# Patient Record
Sex: MALE | Race: WHITE | NOT HISPANIC OR LATINO
[De-identification: names, ages, dates, MRNs, and addresses within clinical notes are randomized per-mention and may not be internally consistent; named-entity substitution may affect disease eponyms.]

---

## 2019-04-18 PROBLEM — Z00.00 ENCOUNTER FOR PREVENTIVE HEALTH EXAMINATION: Status: ACTIVE | Noted: 2019-04-18

## 2019-04-22 ENCOUNTER — NON-APPOINTMENT (OUTPATIENT)
Age: 54
End: 2019-04-22

## 2019-04-22 ENCOUNTER — APPOINTMENT (OUTPATIENT)
Dept: CARDIOLOGY | Facility: CLINIC | Age: 54
End: 2019-04-22
Payer: COMMERCIAL

## 2019-04-22 VITALS
SYSTOLIC BLOOD PRESSURE: 140 MMHG | DIASTOLIC BLOOD PRESSURE: 90 MMHG | WEIGHT: 183 LBS | OXYGEN SATURATION: 98 % | TEMPERATURE: 98 F | BODY MASS INDEX: 26.2 KG/M2 | HEIGHT: 70 IN | HEART RATE: 80 BPM

## 2019-04-22 DIAGNOSIS — Z83.3 FAMILY HISTORY OF DIABETES MELLITUS: ICD-10-CM

## 2019-04-22 DIAGNOSIS — Z78.9 OTHER SPECIFIED HEALTH STATUS: ICD-10-CM

## 2019-04-22 DIAGNOSIS — Z86.39 PERSONAL HISTORY OF OTHER ENDOCRINE, NUTRITIONAL AND METABOLIC DISEASE: ICD-10-CM

## 2019-04-22 PROCEDURE — 93000 ELECTROCARDIOGRAM COMPLETE: CPT

## 2019-04-22 PROCEDURE — 99203 OFFICE O/P NEW LOW 30 MIN: CPT

## 2019-04-24 ENCOUNTER — TRANSCRIPTION ENCOUNTER (OUTPATIENT)
Age: 54
End: 2019-04-24

## 2019-04-24 ENCOUNTER — INPATIENT (INPATIENT)
Facility: HOSPITAL | Age: 54
LOS: 0 days | Discharge: HOME | DRG: 247 | End: 2019-04-25
Attending: HOSPITALIST | Admitting: INTERNAL MEDICINE
Payer: COMMERCIAL

## 2019-04-24 VITALS
SYSTOLIC BLOOD PRESSURE: 171 MMHG | HEART RATE: 64 BPM | OXYGEN SATURATION: 96 % | TEMPERATURE: 98 F | WEIGHT: 184.97 LBS | HEIGHT: 70 IN | DIASTOLIC BLOOD PRESSURE: 91 MMHG | RESPIRATION RATE: 18 BRPM

## 2019-04-24 DIAGNOSIS — R07.89 OTHER CHEST PAIN: ICD-10-CM

## 2019-04-24 LAB
ALBUMIN SERPL ELPH-MCNC: 4.6 G/DL — SIGNIFICANT CHANGE UP (ref 3.3–5)
ALP SERPL-CCNC: 75 U/L — SIGNIFICANT CHANGE UP (ref 40–120)
ALT FLD-CCNC: 46 U/L — HIGH (ref 10–45)
ANION GAP SERPL CALC-SCNC: 12 MMOL/L — SIGNIFICANT CHANGE UP (ref 5–17)
AST SERPL-CCNC: 24 U/L — SIGNIFICANT CHANGE UP (ref 10–40)
BILIRUB SERPL-MCNC: 0.2 MG/DL — SIGNIFICANT CHANGE UP (ref 0.2–1.2)
BUN SERPL-MCNC: 18 MG/DL — SIGNIFICANT CHANGE UP (ref 7–23)
CALCIUM SERPL-MCNC: 9.6 MG/DL — SIGNIFICANT CHANGE UP (ref 8.4–10.5)
CHLORIDE SERPL-SCNC: 103 MMOL/L — SIGNIFICANT CHANGE UP (ref 96–108)
CO2 SERPL-SCNC: 23 MMOL/L — SIGNIFICANT CHANGE UP (ref 22–31)
CREAT SERPL-MCNC: 0.74 MG/DL — SIGNIFICANT CHANGE UP (ref 0.5–1.3)
GLUCOSE BLDC GLUCOMTR-MCNC: 122 MG/DL — HIGH (ref 70–99)
GLUCOSE BLDC GLUCOMTR-MCNC: 132 MG/DL — HIGH (ref 70–99)
GLUCOSE BLDC GLUCOMTR-MCNC: 259 MG/DL — HIGH (ref 70–99)
GLUCOSE SERPL-MCNC: 136 MG/DL — HIGH (ref 70–99)
HCT VFR BLD CALC: 38.6 % — LOW (ref 39–50)
HGB BLD-MCNC: 13.1 G/DL — SIGNIFICANT CHANGE UP (ref 13–17)
MCHC RBC-ENTMCNC: 29.9 PG — SIGNIFICANT CHANGE UP (ref 27–34)
MCHC RBC-ENTMCNC: 33.9 GM/DL — SIGNIFICANT CHANGE UP (ref 32–36)
MCV RBC AUTO: 88.3 FL — SIGNIFICANT CHANGE UP (ref 80–100)
PLATELET # BLD AUTO: 241 K/UL — SIGNIFICANT CHANGE UP (ref 150–400)
POTASSIUM SERPL-MCNC: 4.1 MMOL/L — SIGNIFICANT CHANGE UP (ref 3.5–5.3)
POTASSIUM SERPL-SCNC: 4.1 MMOL/L — SIGNIFICANT CHANGE UP (ref 3.5–5.3)
PROT SERPL-MCNC: 7.1 G/DL — SIGNIFICANT CHANGE UP (ref 6–8.3)
RBC # BLD: 4.38 M/UL — SIGNIFICANT CHANGE UP (ref 4.2–5.8)
RBC # FLD: 12.3 % — SIGNIFICANT CHANGE UP (ref 10.3–14.5)
SODIUM SERPL-SCNC: 138 MMOL/L — SIGNIFICANT CHANGE UP (ref 135–145)
WBC # BLD: 9.2 K/UL — SIGNIFICANT CHANGE UP (ref 3.8–10.5)
WBC # FLD AUTO: 9.2 K/UL — SIGNIFICANT CHANGE UP (ref 3.8–10.5)

## 2019-04-24 PROCEDURE — 93458 L HRT ARTERY/VENTRICLE ANGIO: CPT | Mod: 26,59,GC

## 2019-04-24 PROCEDURE — 92928 PRQ TCAT PLMT NTRAC ST 1 LES: CPT | Mod: RC,GC

## 2019-04-24 PROCEDURE — 99152 MOD SED SAME PHYS/QHP 5/>YRS: CPT | Mod: GC

## 2019-04-24 PROCEDURE — 93010 ELECTROCARDIOGRAM REPORT: CPT | Mod: 76

## 2019-04-24 RX ORDER — SODIUM CHLORIDE 9 MG/ML
1000 INJECTION, SOLUTION INTRAVENOUS
Qty: 0 | Refills: 0 | Status: DISCONTINUED | OUTPATIENT
Start: 2019-04-24 | End: 2019-04-25

## 2019-04-24 RX ORDER — ACETAMINOPHEN 500 MG
650 TABLET ORAL EVERY 6 HOURS
Qty: 0 | Refills: 0 | Status: DISCONTINUED | OUTPATIENT
Start: 2019-04-24 | End: 2019-04-25

## 2019-04-24 RX ORDER — OXYCODONE AND ACETAMINOPHEN 5; 325 MG/1; MG/1
1 TABLET ORAL EVERY 4 HOURS
Qty: 0 | Refills: 0 | Status: DISCONTINUED | OUTPATIENT
Start: 2019-04-24 | End: 2019-04-25

## 2019-04-24 RX ORDER — SODIUM CHLORIDE 9 MG/ML
3 INJECTION INTRAMUSCULAR; INTRAVENOUS; SUBCUTANEOUS EVERY 8 HOURS
Qty: 0 | Refills: 0 | Status: DISCONTINUED | OUTPATIENT
Start: 2019-04-24 | End: 2019-04-25

## 2019-04-24 RX ORDER — DEXTROSE 50 % IN WATER 50 %
15 SYRINGE (ML) INTRAVENOUS ONCE
Qty: 0 | Refills: 0 | Status: DISCONTINUED | OUTPATIENT
Start: 2019-04-24 | End: 2019-04-25

## 2019-04-24 RX ORDER — GLUCAGON INJECTION, SOLUTION 0.5 MG/.1ML
1 INJECTION, SOLUTION SUBCUTANEOUS ONCE
Qty: 0 | Refills: 0 | Status: DISCONTINUED | OUTPATIENT
Start: 2019-04-24 | End: 2019-04-25

## 2019-04-24 RX ORDER — ATORVASTATIN CALCIUM 80 MG/1
80 TABLET, FILM COATED ORAL AT BEDTIME
Qty: 0 | Refills: 0 | Status: DISCONTINUED | OUTPATIENT
Start: 2019-04-24 | End: 2019-04-25

## 2019-04-24 RX ORDER — DEXTROSE 50 % IN WATER 50 %
25 SYRINGE (ML) INTRAVENOUS ONCE
Qty: 0 | Refills: 0 | Status: DISCONTINUED | OUTPATIENT
Start: 2019-04-24 | End: 2019-04-25

## 2019-04-24 RX ORDER — DEXTROSE 50 % IN WATER 50 %
12.5 SYRINGE (ML) INTRAVENOUS ONCE
Qty: 0 | Refills: 0 | Status: DISCONTINUED | OUTPATIENT
Start: 2019-04-24 | End: 2019-04-25

## 2019-04-24 RX ORDER — LEVOTHYROXINE SODIUM 125 MCG
75 TABLET ORAL DAILY
Qty: 0 | Refills: 0 | Status: DISCONTINUED | OUTPATIENT
Start: 2019-04-24 | End: 2019-04-25

## 2019-04-24 RX ORDER — INSULIN LISPRO 100/ML
VIAL (ML) SUBCUTANEOUS AT BEDTIME
Qty: 0 | Refills: 0 | Status: DISCONTINUED | OUTPATIENT
Start: 2019-04-24 | End: 2019-04-25

## 2019-04-24 RX ORDER — METOPROLOL TARTRATE 50 MG
50 TABLET ORAL DAILY
Qty: 0 | Refills: 0 | Status: DISCONTINUED | OUTPATIENT
Start: 2019-04-24 | End: 2019-04-25

## 2019-04-24 RX ORDER — TICAGRELOR 90 MG/1
90 TABLET ORAL
Qty: 0 | Refills: 0 | Status: DISCONTINUED | OUTPATIENT
Start: 2019-04-25 | End: 2019-04-25

## 2019-04-24 RX ORDER — INSULIN LISPRO 100/ML
VIAL (ML) SUBCUTANEOUS
Qty: 0 | Refills: 0 | Status: DISCONTINUED | OUTPATIENT
Start: 2019-04-24 | End: 2019-04-25

## 2019-04-24 RX ORDER — INSULIN GLARGINE 100 [IU]/ML
28 INJECTION, SOLUTION SUBCUTANEOUS AT BEDTIME
Qty: 0 | Refills: 0 | Status: DISCONTINUED | OUTPATIENT
Start: 2019-04-24 | End: 2019-04-25

## 2019-04-24 RX ORDER — ASPIRIN/CALCIUM CARB/MAGNESIUM 324 MG
81 TABLET ORAL DAILY
Qty: 0 | Refills: 0 | Status: DISCONTINUED | OUTPATIENT
Start: 2019-04-25 | End: 2019-04-25

## 2019-04-24 RX ADMIN — ATORVASTATIN CALCIUM 80 MILLIGRAM(S): 80 TABLET, FILM COATED ORAL at 22:00

## 2019-04-24 RX ADMIN — Medication 50 MILLIGRAM(S): at 22:00

## 2019-04-24 RX ADMIN — Medication 1: at 22:00

## 2019-04-24 RX ADMIN — Medication 5 MILLIGRAM(S): at 22:00

## 2019-04-24 RX ADMIN — SODIUM CHLORIDE 3 MILLILITER(S): 9 INJECTION INTRAMUSCULAR; INTRAVENOUS; SUBCUTANEOUS at 21:58

## 2019-04-24 RX ADMIN — OXYCODONE AND ACETAMINOPHEN 1 TABLET(S): 5; 325 TABLET ORAL at 22:08

## 2019-04-24 RX ADMIN — OXYCODONE AND ACETAMINOPHEN 1 TABLET(S): 5; 325 TABLET ORAL at 22:43

## 2019-04-24 RX ADMIN — INSULIN GLARGINE 28 UNIT(S): 100 INJECTION, SOLUTION SUBCUTANEOUS at 22:01

## 2019-04-24 NOTE — PROGRESS NOTE ADULT - SUBJECTIVE AND OBJECTIVE BOX
Removal of Right Radial Band    Pulses in the right upper extremity are palpable. The patient was placed in the supine position. The insertion site was identified and the band deflated per protocol. The radial band was removed slowly. Direct pressure was applied for 15 minutes.      Monitoring of the right wrist and both upper extremities including neuro-vascular checks and vital signs every 15 minutes x 4.    Complications: None    Comments:

## 2019-04-24 NOTE — H&P CARDIOLOGY - HISTORY OF PRESENT ILLNESS
54 y/o  male with PMHx of DMT2 x 9 years, Dyslidemia presents with chest discomfort when bending or stretching. Pt with no symptoms with exercise, pt with ocassional dyspnea. Pt underwent Nuclear Stress Test which showed reveal inferoseptal defect, LVEF 43%, normal EST. Pt was referred for Cardiac Cath by DR Melgar 54 y/o  male with PMHx of DMT2 x 9 years ([id number]-6.6 3/2019, managed by Dr Barbosa PCP  ), Dyslidemia presents with chest discomfort when bending or stretching. Pt with no symptoms with exercise, pt with ocassional dyspnea. Pt underwent Nuclear Stress Test which showed reveal inferoseptal defect, LVEF 43%, normal EST. Pt was referred for Cardiac Cath by DR Melgar 54 y/o  male with PMHx of DMT2 x 9 years (Iafy2k-8.6 3/2019, managed by Dr Barbosa PCP  ), Dyslidemia, ARELIS-does not use CPAP presents with chest discomfort when bending or stretching. Pt with no symptoms with exercise, pt with ocassional dyspnea. Pt underwent Nuclear Stress Test which showed reveal inferoseptal defect, LVEF 43%, normal EST. Pt was referred for Cardiac Cath by DR Melgar.

## 2019-04-25 ENCOUNTER — TRANSCRIPTION ENCOUNTER (OUTPATIENT)
Age: 54
End: 2019-04-25

## 2019-04-25 VITALS
DIASTOLIC BLOOD PRESSURE: 89 MMHG | SYSTOLIC BLOOD PRESSURE: 161 MMHG | RESPIRATION RATE: 17 BRPM | HEART RATE: 66 BPM | TEMPERATURE: 98 F | OXYGEN SATURATION: 99 %

## 2019-04-25 LAB
ANION GAP SERPL CALC-SCNC: 13 MMOL/L — SIGNIFICANT CHANGE UP (ref 5–17)
BASOPHILS # BLD AUTO: 0.1 K/UL — SIGNIFICANT CHANGE UP (ref 0–0.2)
BASOPHILS NFR BLD AUTO: 0.8 % — SIGNIFICANT CHANGE UP (ref 0–2)
BUN SERPL-MCNC: 17 MG/DL — SIGNIFICANT CHANGE UP (ref 7–23)
CALCIUM SERPL-MCNC: 8.9 MG/DL — SIGNIFICANT CHANGE UP (ref 8.4–10.5)
CHLORIDE SERPL-SCNC: 104 MMOL/L — SIGNIFICANT CHANGE UP (ref 96–108)
CO2 SERPL-SCNC: 23 MMOL/L — SIGNIFICANT CHANGE UP (ref 22–31)
CREAT SERPL-MCNC: 0.84 MG/DL — SIGNIFICANT CHANGE UP (ref 0.5–1.3)
EOSINOPHIL # BLD AUTO: 0.3 K/UL — SIGNIFICANT CHANGE UP (ref 0–0.5)
EOSINOPHIL NFR BLD AUTO: 4.3 % — SIGNIFICANT CHANGE UP (ref 0–6)
GLUCOSE BLDC GLUCOMTR-MCNC: 157 MG/DL — HIGH (ref 70–99)
GLUCOSE SERPL-MCNC: 218 MG/DL — HIGH (ref 70–99)
HBA1C BLD-MCNC: 8.9 % — HIGH (ref 4–5.6)
HCT VFR BLD CALC: 34.2 % — LOW (ref 39–50)
HGB BLD-MCNC: 12.1 G/DL — LOW (ref 13–17)
LYMPHOCYTES # BLD AUTO: 1.6 K/UL — SIGNIFICANT CHANGE UP (ref 1–3.3)
LYMPHOCYTES # BLD AUTO: 19.6 % — SIGNIFICANT CHANGE UP (ref 13–44)
MCHC RBC-ENTMCNC: 31.3 PG — SIGNIFICANT CHANGE UP (ref 27–34)
MCHC RBC-ENTMCNC: 35.5 GM/DL — SIGNIFICANT CHANGE UP (ref 32–36)
MCV RBC AUTO: 88.1 FL — SIGNIFICANT CHANGE UP (ref 80–100)
MONOCYTES # BLD AUTO: 0.6 K/UL — SIGNIFICANT CHANGE UP (ref 0–0.9)
MONOCYTES NFR BLD AUTO: 7.8 % — SIGNIFICANT CHANGE UP (ref 2–14)
NEUTROPHILS # BLD AUTO: 5.5 K/UL — SIGNIFICANT CHANGE UP (ref 1.8–7.4)
NEUTROPHILS NFR BLD AUTO: 67.4 % — SIGNIFICANT CHANGE UP (ref 43–77)
PLATELET # BLD AUTO: 201 K/UL — SIGNIFICANT CHANGE UP (ref 150–400)
POTASSIUM SERPL-MCNC: 3.7 MMOL/L — SIGNIFICANT CHANGE UP (ref 3.5–5.3)
POTASSIUM SERPL-SCNC: 3.7 MMOL/L — SIGNIFICANT CHANGE UP (ref 3.5–5.3)
RBC # BLD: 3.88 M/UL — LOW (ref 4.2–5.8)
RBC # FLD: 12.3 % — SIGNIFICANT CHANGE UP (ref 10.3–14.5)
SODIUM SERPL-SCNC: 140 MMOL/L — SIGNIFICANT CHANGE UP (ref 135–145)
WBC # BLD: 8.1 K/UL — SIGNIFICANT CHANGE UP (ref 3.8–10.5)
WBC # FLD AUTO: 8.1 K/UL — SIGNIFICANT CHANGE UP (ref 3.8–10.5)

## 2019-04-25 PROCEDURE — C9600: CPT | Mod: RC

## 2019-04-25 PROCEDURE — 99153 MOD SED SAME PHYS/QHP EA: CPT

## 2019-04-25 PROCEDURE — 85027 COMPLETE CBC AUTOMATED: CPT

## 2019-04-25 PROCEDURE — C1874: CPT

## 2019-04-25 PROCEDURE — 80048 BASIC METABOLIC PNL TOTAL CA: CPT

## 2019-04-25 PROCEDURE — C1725: CPT

## 2019-04-25 PROCEDURE — 93010 ELECTROCARDIOGRAM REPORT: CPT

## 2019-04-25 PROCEDURE — 99152 MOD SED SAME PHYS/QHP 5/>YRS: CPT

## 2019-04-25 PROCEDURE — C1769: CPT

## 2019-04-25 PROCEDURE — 93458 L HRT ARTERY/VENTRICLE ANGIO: CPT | Mod: 59

## 2019-04-25 PROCEDURE — 93005 ELECTROCARDIOGRAM TRACING: CPT

## 2019-04-25 PROCEDURE — C1894: CPT

## 2019-04-25 PROCEDURE — 83036 HEMOGLOBIN GLYCOSYLATED A1C: CPT

## 2019-04-25 PROCEDURE — C1887: CPT

## 2019-04-25 PROCEDURE — 82962 GLUCOSE BLOOD TEST: CPT

## 2019-04-25 PROCEDURE — 80053 COMPREHEN METABOLIC PANEL: CPT

## 2019-04-25 RX ORDER — TICAGRELOR 90 MG/1
1 TABLET ORAL
Qty: 60 | Refills: 0 | OUTPATIENT
Start: 2019-04-25 | End: 2019-05-24

## 2019-04-25 RX ORDER — ACETAMINOPHEN 500 MG
2 TABLET ORAL
Qty: 0 | Refills: 0 | DISCHARGE
Start: 2019-04-25

## 2019-04-25 RX ORDER — TICAGRELOR 90 MG/1
1 TABLET ORAL
Qty: 180 | Refills: 3
Start: 2019-04-25

## 2019-04-25 RX ADMIN — Medication 5 MILLIGRAM(S): at 05:21

## 2019-04-25 RX ADMIN — SODIUM CHLORIDE 3 MILLILITER(S): 9 INJECTION INTRAMUSCULAR; INTRAVENOUS; SUBCUTANEOUS at 05:21

## 2019-04-25 RX ADMIN — TICAGRELOR 90 MILLIGRAM(S): 90 TABLET ORAL at 05:21

## 2019-04-25 RX ADMIN — Medication 30 MILLILITER(S): at 08:20

## 2019-04-25 RX ADMIN — Medication 1: at 08:11

## 2019-04-25 RX ADMIN — Medication 81 MILLIGRAM(S): at 05:21

## 2019-04-25 RX ADMIN — Medication 75 MICROGRAM(S): at 05:21

## 2019-04-25 NOTE — DISCHARGE NOTE NURSING/CASE MANAGEMENT/SOCIAL WORK - NSDCDPATPORTLINK_GEN_ALL_CORE
You can access the [facility], offered by [facility], by registering with the following website: [email or web address]

## 2019-04-25 NOTE — DISCHARGE NOTE PROVIDER - NSDCCPCAREPLAN_GEN_ALL_CORE_FT
PRINCIPAL DISCHARGE DIAGNOSIS  Diagnosis: CAD (coronary artery disease), native coronary artery  Assessment and Plan of Treatment: Do not stop you Aspirin or Brilinta unless instructed to do so by your cardiologist, they help keep your stented arteries open.   No heavy lifting or pushing/pulling with procedure arm for 2 weeks. No driving for 2 days. You may shower 24 hours following the procedure but avoid baths/swimming for 1 week. Check your wrist site for bleeding and/or swelling daily following procedure and call your doctor immediately if it occurs or if you experience increased pain at the site. Follow up with your cardiologist in 1-2 weeks. You may call [facility] if you have any questions/concerns regarding your procedure [phone].      SECONDARY DISCHARGE DIAGNOSES  Diagnosis: HTN (hypertension)  Assessment and Plan of Treatment: Continue with your blood pressure medications; eat a heart healthy diet with low salt diet; exercise regularly (consult with your physician or cardiologist first); maintain a heart healthy weight; if you smoke - quit (A resource to help you stop smoking is the [facility] [employer] [facility] – phone number [phone].); include healthy ways to manage stress. Continue to follow with your primary care physician or cardiologist.    Diagnosis: HLD (hyperlipidemia)  Assessment and Plan of Treatment: Continue with your cholesterol medications. Eat a heart healthy diet that is low in saturated fats and salt, and includes whole grains, fruits, vegetables and lean protein; exercise regularly (consult with your physician or cardiologist first); maintain a heart healthy weight; if you smoke - quit (A resource to help you stop smoking is the [facility] – phone number [phone].). Continue to follow with your primary physician or cardiologist.    Diagnosis: Type 2 diabetes, HbA1c goal < 7%  Assessment and Plan of Treatment: Your Hemoglobin A1c level is   Continue to follow with your primary care MD or your endocrinologist.  Follow a heart healthy diabetic diet. If you check your fingerstick glucose at home, call your MD if it is greater than 250mg/dL on 2 occasions or less than 100mg/dL on 2 occasions. Know signs of low blood sugar, such as: dizziness, shakiness, sweating, confusion, hunger, nervousness-drink 4 ounces apple juice if occurs and call your doctor. Know early signs of high blood sugar, such as: frequent urination, increased thirst, blurry vision, fatigue, headache - call your doctor if this occurs. Follow with other practitioners to care for your diabetes, such as ophthalmologist and podiatrist. PRINCIPAL DISCHARGE DIAGNOSIS  Diagnosis: CAD (coronary artery disease), native coronary artery  Assessment and Plan of Treatment: Do not stop you Aspirin or Brilinta unless instructed to do so by your cardiologist, they help keep your stented arteries open.   No heavy lifting or pushing/pulling with procedure arm for 2 weeks. No driving for 2 days. You may shower 24 hours following the procedure but avoid baths/swimming for 1 week. Check your wrist site for bleeding and/or swelling daily following procedure and call your doctor immediately if it occurs or if you experience increased pain at the site. Follow up with your cardiologist in 1-2 weeks. You may call [facility] if you have any questions/concerns regarding your procedure [phone].      SECONDARY DISCHARGE DIAGNOSES  Diagnosis: HTN (hypertension)  Assessment and Plan of Treatment: Continue with your blood pressure medications; eat a heart healthy diet with low salt diet; exercise regularly (consult with your physician or cardiologist first); maintain a heart healthy weight; if you smoke - quit (A resource to help you stop smoking is the [facility] [employer] Control – phone number [phone].); include healthy ways to manage stress. Continue to follow with your primary care physician or cardiologist.    Diagnosis: HLD (hyperlipidemia)  Assessment and Plan of Treatment: Continue with your cholesterol medications. Eat a heart healthy diet that is low in saturated fats and salt, and includes whole grains, fruits, vegetables and lean protein; exercise regularly (consult with your physician or cardiologist first); maintain a heart healthy weight; if you smoke - quit (A resource to help you stop smoking is the [facility] for Tobacco [facility] – phone number [phone].). Continue to follow with your primary physician or cardiologist.    Diagnosis: Type 2 diabetes, HbA1c goal < 7%  Assessment and Plan of Treatment: Your Hemoglobin A1c level is 8.9 .  Continue to follow with your primary care MD or your endocrinologist.  Follow a heart healthy diabetic diet. If you check your fingerstick glucose at home, call your MD if it is greater than 250mg/dL on 2 occasions or less than 100mg/dL on 2 occasions. Know signs of low blood sugar, such as: dizziness, shakiness, sweating, confusion, hunger, nervousness-drink 4 ounces apple juice if occurs and call your doctor. Know early signs of high blood sugar, such as: frequent urination, increased thirst, blurry vision, fatigue, headache - call your doctor if this occurs. Follow with other practitioners to care for your diabetes, such as ophthalmologist and podiatrist.

## 2019-04-25 NOTE — DISCHARGE NOTE PROVIDER - CARE PROVIDER_API CALL
Milton Melgar)  Cardiology; Internal Medicine  [address], [address]  [place] [address]  Phone: [phone]  Fax: [phone]  Follow Up Time: Milton Melgar)  Cardiology; Internal Medicine  [address] [facility] [address]  [place] [address]  Phone: [phone]  Fax: [phone]  Follow Up Time: 2 weeks

## 2019-04-25 NOTE — DISCHARGE NOTE PROVIDER - HOSPITAL COURSE
HPI:    54 y/o  male with PMHx of DMT2 x 9 years ([id number]-6.6 3/2019, managed by Dr Barbosa PCP  ), Dyslidemia, ARELIS-does not use CPAP presents with chest discomfort when bending or stretching. Pt with no symptoms with exercise, pt with ocassional dyspnea. Pt underwent Nuclear Stress Test which showed reveal inferoseptal defect, LVEF 43%, normal EST. Pt was referred for Cardiac Cath by DR Melgar. (24 Apr 2019 16:34)        4/24 cardiac cath with 2 mid RCA stents. Right radial site without swelling, bleeding. 52 y/o  male with PMHx of DMT2 x 9 years (Qggd1p-7.6 3/2019, managed by Dr Barbosa PCP  ), Dyslidemia, ARELIS-does not use CPAP presents with chest discomfort when bending or stretching. Pt with no symptoms with exercise, pt with ocassional dyspnea. Pt underwent Nuclear Stress Test which showed reveal inferoseptal defect, LVEF 43%, normal EST. Pt was referred for Cardiac Cath by DR Melgar.  Patient is now s/p ELSY x 2 to mRCA via RRA access on 4/24/2019.  He tolerated the procedure well.  Post PCI EKG without acute changes.  RRA site benign without presence of bleeding/hematoma, radial pulse palpable, patient is without complaints.  Patient remains hemodynamically stable and his hospital course was otherwise uneventful.  Patient is now medically stable for discharge home today. 52 y/o  male with PMHx of DMT2 x 9 years ([id number]-8.6 3/2019, managed by Dr Barbosa PCP  ), Dyslidemia, ARELIS-does not use CPAP presents with chest discomfort when bending or stretching. Pt with no symptoms with exercise, pt with ocassional dyspnea. Pt underwent Nuclear Stress Test which showed reveal inferoseptal defect, LVEF 43%, normal EST. Pt was referred for Cardiac Cath by DR Melgar.  Patient is now s/p ELSY x 2 to mRCA via RRA access on 4/24/2019.  He tolerated the procedure well.  Post PCI EKG without acute changes.  RRA site benign without presence of bleeding/hematoma, radial pulse palpable, patient is without complaints.  Hemoglobin A1C 8.9, patient to follow-up with his PCP as outpatient.  Patient to f/u with Dr. Melgar as outpatient for BP control.  Patient remains hemodynamically stable and his hospital course was otherwise uneventful.  Patient is now medically stable for discharge home today.

## 2019-04-25 NOTE — DISCHARGE NOTE PROVIDER - NSDCPNSUBOBJ_GEN_ALL_CORE
Patient is a 53y old  Male who presents with a chief complaint of abnormal stress test (2019 01:35)                    Allergies        No Known Allergies        Intolerances                Medications:    acetaminophen   Tablet .. 650 milliGRAM(s) Oral every 6 hours PRN    aspirin enteric coated 81 milliGRAM(s) Oral daily    atorvastatin 80 milliGRAM(s) Oral at bedtime    dextrose 40% Gel 15 Gram(s) Oral once PRN    dextrose 5%. 1000 milliLiter(s) IV Continuous <Continuous>    dextrose 50% Injectable 12.5 Gram(s) IV Push once    dextrose 50% Injectable 25 Gram(s) IV Push once    dextrose 50% Injectable 25 Gram(s) IV Push once    enalapril 5 milliGRAM(s) Oral daily    glucagon  Injectable 1 milliGRAM(s) IntraMuscular once PRN    insulin glargine Injectable (LANTUS) 28 Unit(s) SubCutaneous at bedtime    insulin lispro (HumaLOG) corrective regimen sliding scale   SubCutaneous three times a day before meals    insulin lispro (HumaLOG) corrective regimen sliding scale   SubCutaneous at bedtime    levothyroxine 75 MICROGram(s) Oral daily    metoprolol succinate ER 50 milliGRAM(s) Oral daily    oxyCODONE    5 mG/acetaminophen 325 mG 1 Tablet(s) Oral every 4 hours PRN    sodium chloride 0.9% lock flush 3 milliLiter(s) IV Push every 8 hours    ticagrelor 90 milliGRAM(s) Oral two times a day            Vitals:    T(C): 36.3 (19 @ 19:30), Max: 36.6 (19 @ 16:34)    HR: 61 (19 @ 01:30) (61 - 72)    BP: 180/98 (19 @ 01:30) (153/96 - 180/98)    BP(mean): 117 (19 @ 16:34) (117 - 117)    RR: 16 (19 @ 01:30) (16 - 18)    SpO2: 98% (19 @ 01:30) (95% - 100%)    Wt(kg): --    Daily Height in cm: 177.8 (2019 19:30)      Daily Weight in k.9 (2019 16:34)    I&O's Summary        2019 07:01  -  2019 01:48    --------------------------------------------------------    IN: 240 mL / OUT: 900 mL / NET: -660 mL                    Physical Exam:    Appearance: Normal    Eyes: PERRL, EOMI    HENT: Normal oral muscosa, NC/AT    Cardiovascular: S1S2, RRR, No M/R/G, no JVD, No Lower extremity edema    Procedural Access Site: No hematoma, Non-tender to palpation, 2+ pulse, No bruit, No Ecchymosis    Respiratory: Clear to auscultation bilaterally    Gastrointestinal: Soft, Non tender, Normal Bowel Sounds    Musculoskeletal: No clubbing, No joint deformity     Neurologic: Non-focal    Lymphatic: No lymphadenopathy    Psychiatry: AAOx3, Mood & affect appropriate    Skin: No rashes, No ecchymoses, No cyanosis                140  |  104  |  17    ----------------------------<  218<H>    3.7   |  23  |  0.84        Ca    8.9      2019 00:57        TPro  7.1  /  Alb  4.6  /  TBili  0.2  /  DBili  x   /  AST  24  /  ALT  46<H>  /  AlkPhos  75  04-24                        Lipid panel     Hgb A1c                             12.1     8.1   )-----------( 201      ( 2019 00:57 )               34.2                 ECG: SR 63 bpm        Cath: 2 mid RCA stents        Imaging:        Interpretation of Telemetry:

## 2019-04-29 NOTE — PHYSICAL EXAM
[General Appearance - Well Developed] : well developed [Normal Appearance] : normal appearance [No Deformities] : no deformities [Well Groomed] : well groomed [General Appearance - Well Nourished] : well nourished [General Appearance - In No Acute Distress] : no acute distress [Normal Conjunctiva] : the conjunctiva exhibited no abnormalities [Normal Oral Mucosa] : normal oral mucosa [Eyelids - No Xanthelasma] : the eyelids demonstrated no xanthelasmas [No Oral Pallor] : no oral pallor [No Oral Cyanosis] : no oral cyanosis [Normal Jugular Venous A Waves Present] : normal jugular venous A waves present [Normal Jugular Venous V Waves Present] : normal jugular venous V waves present [Heart Rate And Rhythm] : heart rate and rhythm were normal [No Jugular Venous Clemente A Waves] : no jugular venous clemente A waves [Heart Sounds] : normal S1 and S2 [Murmurs] : no murmurs present [Exaggerated Use Of Accessory Muscles For Inspiration] : no accessory muscle use [Auscultation Breath Sounds / Voice Sounds] : lungs were clear to auscultation bilaterally [Respiration, Rhythm And Depth] : normal respiratory rhythm and effort [Abdomen Soft] : soft [Abdomen Tenderness] : non-tender [Abdomen Mass (___ Cm)] : no abdominal mass palpated [Gait - Sufficient For Exercise Testing] : the gait was sufficient for exercise testing [Abnormal Walk] : normal gait [Cyanosis, Localized] : no localized cyanosis [Nail Clubbing] : no clubbing of the fingernails [Petechial Hemorrhages (___cm)] : no petechial hemorrhages [] : no rash [No Venous Stasis] : no venous stasis [Skin Color & Pigmentation] : normal skin color and pigmentation [Skin Lesions] : no skin lesions [No Skin Ulcers] : no skin ulcer [No Xanthoma] : no  xanthoma was observed [Oriented To Time, Place, And Person] : oriented to person, place, and time [Affect] : the affect was normal [Mood] : the mood was normal [No Anxiety] : not feeling anxious [FreeTextEntry1] : reproducible pain left chest

## 2019-04-29 NOTE — PHYSICAL EXAM
[General Appearance - Well Developed] : well developed [Normal Appearance] : normal appearance [General Appearance - Well Nourished] : well nourished [No Deformities] : no deformities [Well Groomed] : well groomed [Normal Conjunctiva] : the conjunctiva exhibited no abnormalities [General Appearance - In No Acute Distress] : no acute distress [Normal Oral Mucosa] : normal oral mucosa [Eyelids - No Xanthelasma] : the eyelids demonstrated no xanthelasmas [No Oral Pallor] : no oral pallor [No Oral Cyanosis] : no oral cyanosis [Normal Jugular Venous V Waves Present] : normal jugular venous V waves present [Normal Jugular Venous A Waves Present] : normal jugular venous A waves present [No Jugular Venous Clemente A Waves] : no jugular venous clemente A waves [Heart Rate And Rhythm] : heart rate and rhythm were normal [Murmurs] : no murmurs present [Heart Sounds] : normal S1 and S2 [Auscultation Breath Sounds / Voice Sounds] : lungs were clear to auscultation bilaterally [Exaggerated Use Of Accessory Muscles For Inspiration] : no accessory muscle use [Respiration, Rhythm And Depth] : normal respiratory rhythm and effort [Abdomen Soft] : soft [Abdomen Tenderness] : non-tender [Abdomen Mass (___ Cm)] : no abdominal mass palpated [Gait - Sufficient For Exercise Testing] : the gait was sufficient for exercise testing [Abnormal Walk] : normal gait [Cyanosis, Localized] : no localized cyanosis [Nail Clubbing] : no clubbing of the fingernails [Petechial Hemorrhages (___cm)] : no petechial hemorrhages [No Venous Stasis] : no venous stasis [] : no rash [Skin Color & Pigmentation] : normal skin color and pigmentation [Skin Lesions] : no skin lesions [No Skin Ulcers] : no skin ulcer [No Xanthoma] : no  xanthoma was observed [Oriented To Time, Place, And Person] : oriented to person, place, and time [Affect] : the affect was normal [Mood] : the mood was normal [No Anxiety] : not feeling anxious [FreeTextEntry1] : reproducible pain left chest

## 2019-04-29 NOTE — HISTORY OF PRESENT ILLNESS
[FreeTextEntry1] : 52 y/o pt with dm x 9 years /pt with dyslidemia .pt s/p recent nuclear stress test with infero septal ischemia pt with 9 minutes of exercise .pt with nl est .pt with chest discomfort when bending or streerting .pt with no symptoms with exercise pt with ocassional dyspnea .

## 2019-05-09 ENCOUNTER — INPATIENT (INPATIENT)
Facility: HOSPITAL | Age: 54
LOS: 3 days | Discharge: HOME | DRG: 247 | End: 2019-05-13
Attending: HOSPITALIST | Admitting: INTERNAL MEDICINE
Payer: COMMERCIAL

## 2019-05-09 ENCOUNTER — APPOINTMENT (OUTPATIENT)
Dept: CARDIOLOGY | Facility: CLINIC | Age: 54
End: 2019-05-09
Payer: COMMERCIAL

## 2019-05-09 ENCOUNTER — NON-APPOINTMENT (OUTPATIENT)
Age: 54
End: 2019-05-09

## 2019-05-09 VITALS
SYSTOLIC BLOOD PRESSURE: 147 MMHG | TEMPERATURE: 98 F | OXYGEN SATURATION: 97 % | WEIGHT: 184.97 LBS | RESPIRATION RATE: 16 BRPM | DIASTOLIC BLOOD PRESSURE: 85 MMHG | HEIGHT: 70 IN | HEART RATE: 74 BPM

## 2019-05-09 VITALS
WEIGHT: 182 LBS | TEMPERATURE: 98.2 F | OXYGEN SATURATION: 98 % | HEART RATE: 84 BPM | BODY MASS INDEX: 26.05 KG/M2 | DIASTOLIC BLOOD PRESSURE: 80 MMHG | SYSTOLIC BLOOD PRESSURE: 140 MMHG | HEIGHT: 70 IN

## 2019-05-09 DIAGNOSIS — R07.9 CHEST PAIN, UNSPECIFIED: ICD-10-CM

## 2019-05-09 DIAGNOSIS — I10 ESSENTIAL (PRIMARY) HYPERTENSION: ICD-10-CM

## 2019-05-09 DIAGNOSIS — Z29.9 ENCOUNTER FOR PROPHYLACTIC MEASURES, UNSPECIFIED: ICD-10-CM

## 2019-05-09 DIAGNOSIS — I25.10 ATHEROSCLEROTIC HEART DISEASE OF NATIVE CORONARY ARTERY WITHOUT ANGINA PECTORIS: ICD-10-CM

## 2019-05-09 DIAGNOSIS — E78.5 HYPERLIPIDEMIA, UNSPECIFIED: ICD-10-CM

## 2019-05-09 DIAGNOSIS — E11.9 TYPE 2 DIABETES MELLITUS WITHOUT COMPLICATIONS: ICD-10-CM

## 2019-05-09 DIAGNOSIS — E03.9 HYPOTHYROIDISM, UNSPECIFIED: ICD-10-CM

## 2019-05-09 DIAGNOSIS — Z98.890 OTHER SPECIFIED POSTPROCEDURAL STATES: Chronic | ICD-10-CM

## 2019-05-09 DIAGNOSIS — R94.39 ABNORMAL RESULT OF OTHER CARDIOVASCULAR FUNCTION STUDY: ICD-10-CM

## 2019-05-09 LAB
ALBUMIN SERPL ELPH-MCNC: 4.9 G/DL — SIGNIFICANT CHANGE UP (ref 3.3–5)
ALP SERPL-CCNC: 86 U/L — SIGNIFICANT CHANGE UP (ref 40–120)
ALT FLD-CCNC: 35 U/L — SIGNIFICANT CHANGE UP (ref 10–45)
ANION GAP SERPL CALC-SCNC: 11 MMOL/L — SIGNIFICANT CHANGE UP (ref 5–17)
APTT BLD: 29 SEC — SIGNIFICANT CHANGE UP (ref 27.5–36.3)
APTT BLD: 46.8 SEC — HIGH (ref 27.5–36.3)
AST SERPL-CCNC: 26 U/L — SIGNIFICANT CHANGE UP (ref 10–40)
BASOPHILS # BLD AUTO: 0.1 K/UL — SIGNIFICANT CHANGE UP (ref 0–0.2)
BASOPHILS NFR BLD AUTO: 0.6 % — SIGNIFICANT CHANGE UP (ref 0–2)
BILIRUB SERPL-MCNC: 0.3 MG/DL — SIGNIFICANT CHANGE UP (ref 0.2–1.2)
BUN SERPL-MCNC: 19 MG/DL — SIGNIFICANT CHANGE UP (ref 7–23)
CALCIUM SERPL-MCNC: 10 MG/DL — SIGNIFICANT CHANGE UP (ref 8.4–10.5)
CHLORIDE SERPL-SCNC: 102 MMOL/L — SIGNIFICANT CHANGE UP (ref 96–108)
CO2 SERPL-SCNC: 23 MMOL/L — SIGNIFICANT CHANGE UP (ref 22–31)
CREAT SERPL-MCNC: 0.65 MG/DL — SIGNIFICANT CHANGE UP (ref 0.5–1.3)
EOSINOPHIL # BLD AUTO: 0.4 K/UL — SIGNIFICANT CHANGE UP (ref 0–0.5)
EOSINOPHIL NFR BLD AUTO: 3.9 % — SIGNIFICANT CHANGE UP (ref 0–6)
GAS PNL BLDV: SIGNIFICANT CHANGE UP
GLUCOSE BLDC GLUCOMTR-MCNC: 191 MG/DL — HIGH (ref 70–99)
GLUCOSE BLDC GLUCOMTR-MCNC: 194 MG/DL — HIGH (ref 70–99)
GLUCOSE BLDC GLUCOMTR-MCNC: 232 MG/DL — HIGH (ref 70–99)
GLUCOSE SERPL-MCNC: 218 MG/DL — HIGH (ref 70–99)
HCT VFR BLD CALC: 35.6 % — LOW (ref 39–50)
HCT VFR BLD CALC: 40.1 % — SIGNIFICANT CHANGE UP (ref 39–50)
HGB BLD-MCNC: 12.8 G/DL — LOW (ref 13–17)
HGB BLD-MCNC: 13.8 G/DL — SIGNIFICANT CHANGE UP (ref 13–17)
INR BLD: 0.97 RATIO — SIGNIFICANT CHANGE UP (ref 0.88–1.16)
LYMPHOCYTES # BLD AUTO: 1.4 K/UL — SIGNIFICANT CHANGE UP (ref 1–3.3)
LYMPHOCYTES # BLD AUTO: 14.6 % — SIGNIFICANT CHANGE UP (ref 13–44)
MAGNESIUM SERPL-MCNC: 2 MG/DL — SIGNIFICANT CHANGE UP (ref 1.6–2.6)
MCHC RBC-ENTMCNC: 30.1 PG — SIGNIFICANT CHANGE UP (ref 27–34)
MCHC RBC-ENTMCNC: 31.2 PG — SIGNIFICANT CHANGE UP (ref 27–34)
MCHC RBC-ENTMCNC: 34.5 GM/DL — SIGNIFICANT CHANGE UP (ref 32–36)
MCHC RBC-ENTMCNC: 35.8 GM/DL — SIGNIFICANT CHANGE UP (ref 32–36)
MCV RBC AUTO: 87.2 FL — SIGNIFICANT CHANGE UP (ref 80–100)
MCV RBC AUTO: 87.3 FL — SIGNIFICANT CHANGE UP (ref 80–100)
MONOCYTES # BLD AUTO: 0.7 K/UL — SIGNIFICANT CHANGE UP (ref 0–0.9)
MONOCYTES NFR BLD AUTO: 7.1 % — SIGNIFICANT CHANGE UP (ref 2–14)
NEUTROPHILS # BLD AUTO: 7.2 K/UL — SIGNIFICANT CHANGE UP (ref 1.8–7.4)
NEUTROPHILS NFR BLD AUTO: 73.8 % — SIGNIFICANT CHANGE UP (ref 43–77)
NT-PROBNP SERPL-SCNC: 70 PG/ML — SIGNIFICANT CHANGE UP (ref 0–300)
PLATELET # BLD AUTO: 266 K/UL — SIGNIFICANT CHANGE UP (ref 150–400)
PLATELET # BLD AUTO: 304 K/UL — SIGNIFICANT CHANGE UP (ref 150–400)
POTASSIUM SERPL-MCNC: 4.5 MMOL/L — SIGNIFICANT CHANGE UP (ref 3.5–5.3)
POTASSIUM SERPL-SCNC: 4.5 MMOL/L — SIGNIFICANT CHANGE UP (ref 3.5–5.3)
PROT SERPL-MCNC: 7.6 G/DL — SIGNIFICANT CHANGE UP (ref 6–8.3)
PROTHROM AB SERPL-ACNC: 11.1 SEC — SIGNIFICANT CHANGE UP (ref 10–12.9)
RBC # BLD: 4.09 M/UL — LOW (ref 4.2–5.8)
RBC # BLD: 4.6 M/UL — SIGNIFICANT CHANGE UP (ref 4.2–5.8)
RBC # FLD: 11.9 % — SIGNIFICANT CHANGE UP (ref 10.3–14.5)
RBC # FLD: 12.1 % — SIGNIFICANT CHANGE UP (ref 10.3–14.5)
SODIUM SERPL-SCNC: 136 MMOL/L — SIGNIFICANT CHANGE UP (ref 135–145)
TROPONIN T, HIGH SENSITIVITY RESULT: 7 NG/L — SIGNIFICANT CHANGE UP (ref 0–51)
TROPONIN T, HIGH SENSITIVITY RESULT: 8 NG/L — SIGNIFICANT CHANGE UP (ref 0–51)
WBC # BLD: 7.5 K/UL — SIGNIFICANT CHANGE UP (ref 3.8–10.5)
WBC # BLD: 9.7 K/UL — SIGNIFICANT CHANGE UP (ref 3.8–10.5)
WBC # FLD AUTO: 7.5 K/UL — SIGNIFICANT CHANGE UP (ref 3.8–10.5)
WBC # FLD AUTO: 9.7 K/UL — SIGNIFICANT CHANGE UP (ref 3.8–10.5)

## 2019-05-09 PROCEDURE — 99223 1ST HOSP IP/OBS HIGH 75: CPT

## 2019-05-09 PROCEDURE — 93010 ELECTROCARDIOGRAM REPORT: CPT

## 2019-05-09 PROCEDURE — 71046 X-RAY EXAM CHEST 2 VIEWS: CPT | Mod: 26

## 2019-05-09 PROCEDURE — 99285 EMERGENCY DEPT VISIT HI MDM: CPT | Mod: 25

## 2019-05-09 PROCEDURE — 99214 OFFICE O/P EST MOD 30 MIN: CPT

## 2019-05-09 PROCEDURE — 93000 ELECTROCARDIOGRAM COMPLETE: CPT

## 2019-05-09 RX ORDER — ASPIRIN/CALCIUM CARB/MAGNESIUM 324 MG
81 TABLET ORAL DAILY
Refills: 0 | Status: DISCONTINUED | OUTPATIENT
Start: 2019-05-09 | End: 2019-05-13

## 2019-05-09 RX ORDER — METOPROLOL TARTRATE 50 MG
50 TABLET ORAL DAILY
Refills: 0 | Status: DISCONTINUED | OUTPATIENT
Start: 2019-05-09 | End: 2019-05-13

## 2019-05-09 RX ORDER — ASPIRIN/CALCIUM CARB/MAGNESIUM 324 MG
1 TABLET ORAL
Qty: 0 | Refills: 0 | DISCHARGE

## 2019-05-09 RX ORDER — HEPARIN SODIUM 5000 [USP'U]/ML
INJECTION INTRAVENOUS; SUBCUTANEOUS
Qty: 25000 | Refills: 0 | Status: DISCONTINUED | OUTPATIENT
Start: 2019-05-09 | End: 2019-05-10

## 2019-05-09 RX ORDER — DEXTROSE 50 % IN WATER 50 %
12.5 SYRINGE (ML) INTRAVENOUS ONCE
Refills: 0 | Status: DISCONTINUED | OUTPATIENT
Start: 2019-05-09 | End: 2019-05-13

## 2019-05-09 RX ORDER — INSULIN GLARGINE 100 [IU]/ML
30 INJECTION, SOLUTION SUBCUTANEOUS ONCE
Refills: 0 | Status: COMPLETED | OUTPATIENT
Start: 2019-05-09 | End: 2019-05-09

## 2019-05-09 RX ORDER — INSULIN GLARGINE 100 [IU]/ML
35 INJECTION, SOLUTION SUBCUTANEOUS
Qty: 0 | Refills: 0 | DISCHARGE

## 2019-05-09 RX ORDER — ERGOCALCIFEROL 1.25 MG/1
1 CAPSULE ORAL
Qty: 0 | Refills: 0 | DISCHARGE

## 2019-05-09 RX ORDER — ASPIRIN/CALCIUM CARB/MAGNESIUM 324 MG
324 TABLET ORAL ONCE
Refills: 0 | Status: COMPLETED | OUTPATIENT
Start: 2019-05-09 | End: 2019-05-09

## 2019-05-09 RX ORDER — ATORVASTATIN CALCIUM 80 MG/1
80 TABLET, FILM COATED ORAL AT BEDTIME
Refills: 0 | Status: DISCONTINUED | OUTPATIENT
Start: 2019-05-09 | End: 2019-05-13

## 2019-05-09 RX ORDER — METOPROLOL TARTRATE 50 MG
1 TABLET ORAL
Qty: 0 | Refills: 0 | DISCHARGE

## 2019-05-09 RX ORDER — HEPARIN SODIUM 5000 [USP'U]/ML
5000 INJECTION INTRAVENOUS; SUBCUTANEOUS ONCE
Refills: 0 | Status: COMPLETED | OUTPATIENT
Start: 2019-05-09 | End: 2019-05-09

## 2019-05-09 RX ORDER — SODIUM CHLORIDE 9 MG/ML
1000 INJECTION, SOLUTION INTRAVENOUS
Refills: 0 | Status: DISCONTINUED | OUTPATIENT
Start: 2019-05-09 | End: 2019-05-13

## 2019-05-09 RX ORDER — INSULIN LISPRO 100/ML
VIAL (ML) SUBCUTANEOUS
Refills: 0 | Status: DISCONTINUED | OUTPATIENT
Start: 2019-05-09 | End: 2019-05-09

## 2019-05-09 RX ORDER — ASPIRIN 81 MG/1
81 TABLET, CHEWABLE ORAL DAILY
Refills: 0 | Status: ACTIVE | COMMUNITY
Start: 2019-05-09

## 2019-05-09 RX ORDER — GLUCAGON INJECTION, SOLUTION 0.5 MG/.1ML
1 INJECTION, SOLUTION SUBCUTANEOUS ONCE
Refills: 0 | Status: DISCONTINUED | OUTPATIENT
Start: 2019-05-09 | End: 2019-05-13

## 2019-05-09 RX ORDER — HEPARIN SODIUM 5000 [USP'U]/ML
5000 INJECTION INTRAVENOUS; SUBCUTANEOUS EVERY 6 HOURS
Refills: 0 | Status: DISCONTINUED | OUTPATIENT
Start: 2019-05-09 | End: 2019-05-10

## 2019-05-09 RX ORDER — INSULIN LISPRO 100/ML
VIAL (ML) SUBCUTANEOUS AT BEDTIME
Refills: 0 | Status: DISCONTINUED | OUTPATIENT
Start: 2019-05-09 | End: 2019-05-09

## 2019-05-09 RX ORDER — INSULIN LISPRO 100/ML
10 VIAL (ML) SUBCUTANEOUS
Qty: 0 | Refills: 0 | DISCHARGE

## 2019-05-09 RX ORDER — ALCOHOL ANTISEPTIC PADS
PADS, MEDICATED (EA) TOPICAL
Qty: 100 | Refills: 0 | Status: ACTIVE | COMMUNITY
Start: 2019-03-31

## 2019-05-09 RX ORDER — INSULIN LISPRO 100/ML
VIAL (ML) SUBCUTANEOUS EVERY 6 HOURS
Refills: 0 | Status: DISCONTINUED | OUTPATIENT
Start: 2019-05-09 | End: 2019-05-10

## 2019-05-09 RX ORDER — TICAGRELOR 90 MG/1
180 TABLET ORAL ONCE
Refills: 0 | Status: COMPLETED | OUTPATIENT
Start: 2019-05-09 | End: 2019-05-09

## 2019-05-09 RX ORDER — DEXTROSE 50 % IN WATER 50 %
25 SYRINGE (ML) INTRAVENOUS ONCE
Refills: 0 | Status: DISCONTINUED | OUTPATIENT
Start: 2019-05-09 | End: 2019-05-13

## 2019-05-09 RX ORDER — LEVOTHYROXINE SODIUM 125 MCG
1 TABLET ORAL
Qty: 0 | Refills: 0 | DISCHARGE

## 2019-05-09 RX ORDER — LEVOTHYROXINE SODIUM 125 MCG
75 TABLET ORAL DAILY
Refills: 0 | Status: DISCONTINUED | OUTPATIENT
Start: 2019-05-09 | End: 2019-05-13

## 2019-05-09 RX ORDER — DEXTROSE 50 % IN WATER 50 %
15 SYRINGE (ML) INTRAVENOUS ONCE
Refills: 0 | Status: DISCONTINUED | OUTPATIENT
Start: 2019-05-09 | End: 2019-05-13

## 2019-05-09 RX ORDER — TICAGRELOR 90 MG/1
90 TABLET ORAL
Refills: 0 | Status: DISCONTINUED | OUTPATIENT
Start: 2019-05-09 | End: 2019-05-13

## 2019-05-09 RX ORDER — CHOLECALCIFEROL (VITAMIN D3) 10(400)/ML
32G X 4 MM DROPS ORAL
Qty: 100 | Refills: 0 | Status: ACTIVE | COMMUNITY
Start: 2019-03-31

## 2019-05-09 RX ADMIN — HEPARIN SODIUM 1150 UNIT(S)/HR: 5000 INJECTION INTRAVENOUS; SUBCUTANEOUS at 21:44

## 2019-05-09 RX ADMIN — Medication 324 MILLIGRAM(S): at 13:18

## 2019-05-09 RX ADMIN — TICAGRELOR 180 MILLIGRAM(S): 90 TABLET ORAL at 13:25

## 2019-05-09 RX ADMIN — HEPARIN SODIUM 5000 UNIT(S): 5000 INJECTION INTRAVENOUS; SUBCUTANEOUS at 13:40

## 2019-05-09 RX ADMIN — HEPARIN SODIUM 1000 UNIT(S)/HR: 5000 INJECTION INTRAVENOUS; SUBCUTANEOUS at 13:50

## 2019-05-09 NOTE — CONSULT NOTE ADULT - SUBJECTIVE AND OBJECTIVE BOX
Cardiology Consult  Faculty Cardiology  ==========================================================================    Chief Complaint:  Chest pain.     HPI: 54 M DM, HTN, HLD s/p recent abnormal stress test with cardiac cath and PCI to RCA returns with ongoing chest pain. Pain is unchanged after intervention. It is left sided. Non-radiating. No associated symptoms. He denies SOB. Pain improves on its own. Worse when push on the area.     PMH:   CAD (coronary artery disease)  Hypertension  HLD (hyperlipidemia)  Type 2 diabetes mellitus    PSH:   No significant past surgical history    Medications:   heparin  Infusion.  Unit(s)/Hr IV Continuous <Continuous>  heparin  Injectable 5000 Unit(s) IV Push every 6 hours PRN    Allergies:  No Known Allergies    FAMILY HISTORY:  Family history of heart disease (Grandparent)    Social History:  Smoking: No active  Alcohol:  Drugs:    Review of Systems:  Constitutional: [ ] Fever [ ] Chills [ ] Fatigue [ ] Weight change   HEENT: [ ] Blurred vision [ ] Eye Pain [ ] Headache [ ] Runny nose [ ] Sore Throat   Respiratory: [ ] Cough [ ] Wheezing [ ] Shortness of breath  Cardiovascular: [ ] Chest Pain [ ] Palpitations [ ] OLIVEIRA [ ] PND [ ] Orthopnea  Gastrointestinal: [ ] Abdominal Pain [ ] Diarrhea [ ] Constipation [ ] Hemorrhoids [ ] Nausea [ ] Vomiting  Genitourinary: [ ] Nocturia [ ] Dysuria [ ] Incontinence  Extremities: [ ] Swelling [ ] Joint Pain  Neurologic: [ ] Focal deficit [ ] Paresthesias [ ] Syncope  Lymphatic: [ ] Swelling [ ] Lymphadenopathy   Skin: [ ] Rash [ ] Ecchymoses [ ] Wounds [ ] Lesions  Psychiatry: [ ] Depression [ ] Suicidal/Homicidal Ideation [ ] Anxiety [ ] Sleep Disturbances  [X ] 10 point review of systems is otherwise negative except as mentioned above            [ ]Unable to obtain    Physical Exam:  T(C): 36.7 (05-09-19 @ 19:45), Max: 36.7 (05-09-19 @ 13:10)  HR: 78 (05-09-19 @ 19:45) (67 - 78)  BP: 161/95 (05-09-19 @ 19:45) (117/87 - 167/70)  RR: 18 (05-09-19 @ 19:45) (16 - 18)  SpO2: 95% (05-09-19 @ 19:45) (95% - 100%)  Wt(kg): --    Daily Height in cm: 177.8 (09 May 2019 12:57)    Daily     Appearance: [x ] Normal [x ] NAD  Eyes: [x ] PERRL [x ] EOMI  HENT: [x ] Normal oral muscosa [x ]NC/AT  Neck: [x] Supple [x] FROM [x] No JVD  Cardiovascular: [x ] S1, S2 [x ] RRR [x ] No m/r/g [x ]No edema  Respiratory: [x ] Clear to auscultation bilaterally [x ] Normal Effort  Gastrointestinal: [x ] Soft [x ] Non-tender [x ] Non-distended [x ] BS+  Musculoskeletal: [x ] No clubbing [x] No joint deformity   Neurologic: [x ] Non-focal  Lymphatic: [x ] No lymphadenopathy  Psychiatry: [x ] AAOx3 [x ] Mood & affect appropriate  Skin: [ x] No rashes [ x] No ecchymoses [x ] No cyanosis [x ] warm and dry    Procedural Access Site: [ ] No hematoma [ ] Non-tender to palpation [ ] 2+ pulse [ ] No bruit [ ] No Ecchymosis    Cardiovascular Diagnostic Testing:  ECG: NSR. No acute ST-T changes.     Echo:    Stress Testing:    Cath: < from: Cardiac Cath Lab - Adult (04.24.19 @ 17:43) >  LM:   --  Distal left main: There was a 20 % stenosis.  LAD:   --  LAD: Angiography showed minor luminal irregularities with no  flow limiting lesions.  CX:   --  Circumflex: Angiography showed minor luminal irregularities with  no flow limiting lesions.  RCA:   --  Mid RCA: There was a 80 % stenosis.    < end of copied text >      Interpretation of Telemetry:    Imaging:    Labs:                        12.8   7.5   )-----------( 266      ( 09 May 2019 20:52 )             35.6     05-09    136  |  102  |  19  ----------------------------<  218<H>  4.5   |  23  |  0.65    Ca    10.0      09 May 2019 13:28  Mg     2.0     05-09    TPro  7.6  /  Alb  4.9  /  TBili  0.3  /  DBili  x   /  AST  26  /  ALT  35  /  AlkPhos  86  05-09    PT/INR - ( 09 May 2019 13:28 )   PT: 11.1 sec;   INR: 0.97 ratio         PTT - ( 09 May 2019 20:52 )  PTT:46.8 sec      Serum Pro-Brain Natriuretic Peptide: 70 pg/mL (05-09 @ 13:28)

## 2019-05-09 NOTE — ED PROVIDER NOTE - PMH
CAD (coronary artery disease)  s/p stents  HLD (hyperlipidemia)    Hypertension    Type 2 diabetes mellitus

## 2019-05-09 NOTE — H&P ADULT - NSHPLABSRESULTS_GEN_ALL_CORE
Labs personally reviewed:                          12.8   7.5   )-----------( 266      ( 09 May 2019 20:52 )             35.6     05-09    136  |  102  |  19  ----------------------------<  218<H>  4.5   |  23  |  0.65    Ca    10.0      09 May 2019 13:28  Mg     2.0     05-09    TPro  7.6  /  Alb  4.9  /  TBili  0.3  /  DBili  x   /  AST  26  /  ALT  35  /  AlkPhos  86  05-09        LIVER FUNCTIONS - ( 09 May 2019 13:28 )  Alb: 4.9 g/dL / Pro: 7.6 g/dL / ALK PHOS: 86 U/L / ALT: 35 U/L / AST: 26 U/L / GGT: x           PT/INR - ( 09 May 2019 13:28 )   PT: 11.1 sec;   INR: 0.97 ratio         PTT - ( 09 May 2019 20:52 )  PTT:46.8 sec    CAPILLARY BLOOD GLUCOSE      POCT Blood Glucose.: 232 mg/dL (09 May 2019 21:10)  POCT Blood Glucose.: 194 mg/dL (09 May 2019 13:36)      Imaging:  CXR personally reviewed: no focal opacity    EKG personally reviewed: nsr, no acute st changes    Cardiac cath:  Cath: < from: Cardiac Cath Lab - Adult (04.24.19 @ 17:43) >  LM:   --  Distal left main: There was a 20 % stenosis.  LAD:   --  LAD: Angiography showed minor luminal irregularities with no  flow limiting lesions.  CX:   --  Circumflex: Angiography showed minor luminal irregularities with  no flow limiting lesions.  RCA:   --  Mid RCA: There was a 80 % stenosis.

## 2019-05-09 NOTE — ED CLERICAL - NS ED CLERK NOTE PRE-ARRIVAL INFORMATION; ADDITIONAL PRE-ARRIVAL INFORMATION
CC/Reason For referral:  Angio with Dr. Duque needs blood work cbc sma7 enzymes  Preferred Consultant(if applicable):Dr. Duque  Who admits for you (if needed): Dr. Duque or hospitalist  Do you have documents you would like to fax over?  yes  Would you still like to speak to an ED attending? yes please call Dr. Melgar

## 2019-05-09 NOTE — H&P ADULT - ATTENDING COMMENTS
Patient assigned to me by night hospitalist in charge for management and care for patient for this evening only. Care to be resumed by day hospitalist in the morning and thereafter.     Patient's care rendered on 5/9/19 at 10PM.      I was physically present for the key portions of the evaluation and management (E/M) service provided.  I agree with the above history, physical, and plan which I have reviewed and edited where appropriate.     Plan discussed with Patient, RN

## 2019-05-09 NOTE — ED ADULT TRIAGE NOTE - CHIEF COMPLAINT QUOTE
April 24th had two stents place and is still having CP, feels weak and fatigued, sent here for another possible angio

## 2019-05-09 NOTE — H&P ADULT - NSHPPHYSICALEXAM_GEN_ALL_CORE
PHYSICAL EXAM:  Vital Signs Last 24 Hrs  T(C): 36.7 (05-09-19 @ 19:45)  T(F): 98 (05-09-19 @ 19:45), Max: 98.1 (05-09-19 @ 15:37)  HR: 78 (05-09-19 @ 19:45) (67 - 78)  BP: 161/95 (05-09-19 @ 19:45)  BP(mean): --  RR: 18 (05-09-19 @ 19:45) (16 - 18)  SpO2: 95% (05-09-19 @ 19:45) (95% - 100%)  Wt(kg): --    Constitutional: NAD, awake and alert  EYES: EOMI  ENT:  Normal Hearing, no tonsillar exudates   Neck: Soft and supple, No JVD  Lungs: Breath sounds are clear bilaterally, No wheezing, rales or rhonchi  Heart: S1 and S2, regular rate and rhythm, no Murmurs, gallops or rubs  Abdomen: Bowel Sounds present, soft, nontender, nondistended, no guarding, no rebound  Extremities: No cyanosis or clubbing; warm to touch  Vascular: 2+ peripheral pulses lower ex  Neurological: A/O x 3, no focal deficits  Musculoskeletal: 5/5 strength b/l upper and lower extremities; +tenderness of left lower ribs  Skin: No rashes  Psych: no depression or anhedonia  HEME: no bruises, no nose bleeds

## 2019-05-09 NOTE — CONSULT NOTE ADULT - ASSESSMENT
54 M DM, HTN, HLD with recent PCI now with chest pain.  ·	Given recurring symptoms would arrange for cardiac cath in the AM.   ·	Continue ASA and Brilinta   ·	Continue statin.   ·	Continue other home cardiac medications   ·	Hep gtt for now.   ·	Glycemic control.     =======================================================================  Jonah Whitlock MD [facility]    Please page [phone] with questions  On nights and weekend please call the office [phone].

## 2019-05-09 NOTE — ED PROVIDER NOTE - CLINICAL SUMMARY MEDICAL DECISION MAKING FREE TEXT BOX
cary gregorio with stent rca 2.5 weeks ago now with persistaqnt pain nu stress yesterday with finding cw cath however diffuse wall abnormalities and exercise induced ischemia - will treat with double antiplt, and isabelle driver fellow for cath onsult =- and admisison

## 2019-05-09 NOTE — H&P ADULT - NSHPREVIEWOFSYSTEMS_GEN_ALL_CORE
CONSTITUTIONAL: No weakness, fevers or chills  EYES/ENT: No visual changes;  No dysphagia  NECK: No pain or stiffness  RESPIRATORY: No cough, wheezing, hemoptysis; No shortness of breath  CARDIOVASCULAR: +chest pain, exertional dyspnea; no orthopnea or PND  EXTREMITIES: no le edema, cyanosis, clubbing  MUSCULOSKELETAL: left chest wall pain  GASTROINTESTINAL: No abdominal or epigastric pain. No nausea, vomiting, or hematemesis; No diarrhea or constipation. No melena or hematochezia.  BACK: No back pain  GENITOURINARY: No dysuria, frequency or hematuria  NEUROLOGICAL: No numbness or weakness  SKIN: No itching, burning, rashes, or lesions   PSYCH: no agitation  All other review of systems is negative unless indicated above.

## 2019-05-09 NOTE — H&P ADULT - HISTORY OF PRESENT ILLNESS
53 yo male with PMH of HTN, HLD, DM on insulin, CAD s/p ELSY x 2 to mRCA on 4/24/19, now on aspirin/brilinta, sent here by cardiology for chest pain.        53 y/o male PMHx HTN, HLD, DM II on humalog/lantus, CAD  s/p ELSY x 2 to mRCA 4/24/19 on ASA 81mg/Brilinta was directed to the ED by PMD Dr. Melgar for cardiac cath as patient has had exertional intermittent chest pain since 5/2/19. 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 Patient stated one week ago he experienced severe left sided exertional chest pain which improved with rest with associated SOB, generalized weakness and dizziness. Patient stated the exertional chest pain has been persistent. Patient was seen by cardiologist near his home Dr. Floyd on 5/6/19 in which patient had "mild dilated LV with transient ischemic dilation and post stress decrease in overall LV function especially the septum and anterior wall" which was worse from patient previous stress from cardiac cath. Patient did not take aspirin, Brilinta or insulin today. Patient denied back pain, fever, chills, palpitations, abdominal pain, N/V/D, radiation of pain, smoking or primary relatives with CAD. Last ate at 8:30AM today (blueberries and Alina bar) 55 yo male with PMH of HTN, HLD, DM on insulin, CAD s/p ELSY x 2 to mRCA on 4/24/19, now on aspirin/brilinta, sent here by cardiology for chest pain.  Patient states that he had left sided chest pain for almost tow months.  He describes the pain as nonradiating, associated with diaphoresis, SOB, worse with activities or emotional stress, relieved with aspirin or rest.  Pain is reproducible with palpation of the left lower ribs.  Patient initially had nuclear strest test which reveealed inferoseptal defect, LVEF 43%.  He was admitted for cardiac cath on 4/24/19, had ELSY x 2 to mRCA.   After discharged on 4/25/19, patient continued to have same pain.  He went to another cardiologist close to his house, who sent him for a nuclear stress test, which was done on Tuesday, and showed "mild dilated LV with transient ischemic dilation and post stress decrease in overall LV function especially the septum and anterior wall".  Patient went for follow up today with his cardiologist, and was sent here for persistent chest pain.  Patient otherwise denies SOB at rest, denies orthopnea or PND.  Currently he says he feels comfortable, without any chest pain. 55 yo male with PMH of HTN, HLD, DM on insulin, hypothyroidism, CAD s/p ELSY x 2 to mRCA on 4/24/19, now on aspirin/brilinta, sent here by cardiology for chest pain.  Patient states that he had left sided chest pain for almost tow months.  He describes the pain as nonradiating, associated with diaphoresis, SOB, worse with activities or emotional stress, relieved with aspirin or rest.  Pain is reproducible with palpation of the left lower ribs.  Patient initially had nuclear strest test which reveealed inferoseptal defect, LVEF 43%.  He was admitted for cardiac cath on 4/24/19, had ELSY x 2 to mRCA.   After discharged on 4/25/19, patient continued to have same pain.  He went to another cardiologist close to his house, who sent him for a nuclear stress test, which was done on Tuesday, and showed "mild dilated LV with transient ischemic dilation and post stress decrease in overall LV function especially the septum and anterior wall".  Patient went for follow up today with his cardiologist, and was sent here for persistent chest pain.  Patient otherwise denies SOB at rest, denies orthopnea or PND.  Currently he says he feels comfortable, without any chest pain.

## 2019-05-09 NOTE — ED PROVIDER NOTE - OBJECTIVE STATEMENT
55 y/o male PMHx HTN, HLD, DM II on humalog/lantus, CAD  s/p ELSY x 2 to mRCA 4/24/19 on ASA 81mg/Brilinta was directed to the ED by PMD Dr. Melgar for cardiac cath as patient has had exertional intermittent chest pain since 5/2/19. 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 Patient stated one week ago he experienced severe left sided exertional chest pain which improved with rest with associated SOB, generalized weakness and dizziness. Patient stated the exertional chest pain has been persistent. Patient was seen by cardiologist near his home Dr. Floyd on 5/6/19 in which patient had "mild dilated LV with transient ischemic dilation and post stress decrease in overall LV function especially the septum and anterior wall" which was worse from patient previous stress from cardiac cath. Patient did not take aspirin, Brilinta or insulin today. Patient denied back pain, fever, chills, palpitations, abdominal pain, N/V/D, radiation of pain, smoking or primary relatives with CAD 53 y/o male PMHx HTN, HLD, DM II on humalog/lantus, CAD  s/p ELSY x 2 to mRCA 4/24/19 on ASA 81mg/Brilinta was directed to the ED by PMD Dr. Melgar for cardiac cath as patient has had exertional intermittent chest pain since 5/2/19. 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 Patient stated one week ago he experienced severe left sided exertional chest pain which improved with rest with associated SOB, generalized weakness and dizziness. Patient stated the exertional chest pain has been persistent. Patient was seen by cardiologist near his home Dr. Floyd on 5/6/19 in which patient had "mild dilated LV with transient ischemic dilation and post stress decrease in overall LV function especially the septum and anterior wall" which was worse from patient previous stress from cardiac cath. Patient did not take aspirin, Brilinta or insulin today. Patient denied back pain, fever, chills, palpitations, abdominal pain, N/V/D, radiation of pain, smoking or primary relatives with CAD. Last ate at 8:30AM today (blueberries and Alina bar)

## 2019-05-09 NOTE — H&P ADULT - ASSESSMENT
55 yo male with PMH of HTN, HLD, DM on insulin, CAD s/p ELSY x 2 to mRCA on 4/24/19, now on aspirin/brilinta, sent here by cardiology for chest pain. 55 yo male with PMH of HTN, HLD, DM on insulin, hypothyroidism, CAD s/p ELSY x 2 to mRCA on 4/24/19, now on aspirin/brilinta, sent here by cardiology for chest pain.

## 2019-05-09 NOTE — H&P ADULT - NSICDXFAMILYHX_GEN_ALL_CORE_FT
FAMILY HISTORY:  FHx: diabetes mellitus, Father    Grandparent  Still living? No  Family history of heart disease, Age at diagnosis: Age Unknown

## 2019-05-09 NOTE — H&P ADULT - PROBLEM SELECTOR PLAN 1
Patient with left sided chest pain, likely musculoskeletal  rule out ACS  hstrop 8 --> 7  monitor telemetry  cw aspirin, brilinta, atorvastatin, BB, enalapril  s/p loaded with brilinta 180mg and started hep gtt  keep NPO for possible cardiac cath

## 2019-05-09 NOTE — ED ADULT NURSE NOTE - OBJECTIVE STATEMENT
Pt is an ambulatory 54 yr old male a/oX 3 sent from PMD with abnormal stress test.  Pt has 2 stents placed from exertional chest pain 1st week of May.  Is on PO brillinta and aspirin at home.  Pt was experiencing sob and chest pain and sought outpatient testing.  PERRL wnl, deleon with equal strength Pt had echo, EKG and stress test showing cardiac ischemia.  NSR on cm at 77 bpm.  No fevers, chills or N/V.  Abdomen NT ND.  No urinary symptoms.  Peripheral pulses +2bl no edema

## 2019-05-09 NOTE — H&P ADULT - NSICDXPASTMEDICALHX_GEN_ALL_CORE_FT
PAST MEDICAL HISTORY:  CAD (coronary artery disease) s/p stents    HLD (hyperlipidemia)     Hypertension     Type 2 diabetes mellitus PAST MEDICAL HISTORY:  CAD (coronary artery disease) s/p stents    HLD (hyperlipidemia)     Hypertension     Hypothyroidism     Type 2 diabetes mellitus

## 2019-05-10 ENCOUNTER — TRANSCRIPTION ENCOUNTER (OUTPATIENT)
Age: 54
End: 2019-05-10

## 2019-05-10 DIAGNOSIS — I10 ESSENTIAL (PRIMARY) HYPERTENSION: ICD-10-CM

## 2019-05-10 DIAGNOSIS — E03.9 HYPOTHYROIDISM, UNSPECIFIED: ICD-10-CM

## 2019-05-10 DIAGNOSIS — E78.5 HYPERLIPIDEMIA, UNSPECIFIED: ICD-10-CM

## 2019-05-10 DIAGNOSIS — E11.65 TYPE 2 DIABETES MELLITUS WITH HYPERGLYCEMIA: ICD-10-CM

## 2019-05-10 LAB
ALBUMIN SERPL ELPH-MCNC: 4.3 G/DL — SIGNIFICANT CHANGE UP (ref 3.3–5)
ALP SERPL-CCNC: 77 U/L — SIGNIFICANT CHANGE UP (ref 40–120)
ALT FLD-CCNC: 37 U/L — SIGNIFICANT CHANGE UP (ref 10–45)
ANION GAP SERPL CALC-SCNC: 10 MMOL/L — SIGNIFICANT CHANGE UP (ref 5–17)
APTT BLD: 59.6 SEC — HIGH (ref 27.5–36.3)
AST SERPL-CCNC: 34 U/L — SIGNIFICANT CHANGE UP (ref 10–40)
BASOPHILS # BLD AUTO: 0.11 K/UL — SIGNIFICANT CHANGE UP (ref 0–0.2)
BASOPHILS NFR BLD AUTO: 1.4 % — SIGNIFICANT CHANGE UP (ref 0–2)
BILIRUB SERPL-MCNC: 0.2 MG/DL — SIGNIFICANT CHANGE UP (ref 0.2–1.2)
BUN SERPL-MCNC: 15 MG/DL — SIGNIFICANT CHANGE UP (ref 7–23)
CALCIUM SERPL-MCNC: 9.5 MG/DL — SIGNIFICANT CHANGE UP (ref 8.4–10.5)
CHLORIDE SERPL-SCNC: 103 MMOL/L — SIGNIFICANT CHANGE UP (ref 96–108)
CO2 SERPL-SCNC: 23 MMOL/L — SIGNIFICANT CHANGE UP (ref 22–31)
CREAT SERPL-MCNC: 0.62 MG/DL — SIGNIFICANT CHANGE UP (ref 0.5–1.3)
EOSINOPHIL # BLD AUTO: 0.45 K/UL — SIGNIFICANT CHANGE UP (ref 0–0.5)
EOSINOPHIL NFR BLD AUTO: 5.7 % — SIGNIFICANT CHANGE UP (ref 0–6)
GLUCOSE BLDC GLUCOMTR-MCNC: 169 MG/DL — HIGH (ref 70–99)
GLUCOSE BLDC GLUCOMTR-MCNC: 181 MG/DL — HIGH (ref 70–99)
GLUCOSE BLDC GLUCOMTR-MCNC: 361 MG/DL — HIGH (ref 70–99)
GLUCOSE SERPL-MCNC: 156 MG/DL — HIGH (ref 70–99)
HBA1C BLD-MCNC: 9 % — HIGH (ref 4–5.6)
HCT VFR BLD CALC: 37.3 % — LOW (ref 39–50)
HGB BLD-MCNC: 12.4 G/DL — LOW (ref 13–17)
IMM GRANULOCYTES NFR BLD AUTO: 0.6 % — SIGNIFICANT CHANGE UP (ref 0–1.5)
LYMPHOCYTES # BLD AUTO: 1.69 K/UL — SIGNIFICANT CHANGE UP (ref 1–3.3)
LYMPHOCYTES # BLD AUTO: 21.6 % — SIGNIFICANT CHANGE UP (ref 13–44)
MAGNESIUM SERPL-MCNC: 2 MG/DL — SIGNIFICANT CHANGE UP (ref 1.6–2.6)
MCHC RBC-ENTMCNC: 28.8 PG — SIGNIFICANT CHANGE UP (ref 27–34)
MCHC RBC-ENTMCNC: 33.2 GM/DL — SIGNIFICANT CHANGE UP (ref 32–36)
MCV RBC AUTO: 86.5 FL — SIGNIFICANT CHANGE UP (ref 80–100)
MONOCYTES # BLD AUTO: 0.63 K/UL — SIGNIFICANT CHANGE UP (ref 0–0.9)
MONOCYTES NFR BLD AUTO: 8 % — SIGNIFICANT CHANGE UP (ref 2–14)
NEUTROPHILS # BLD AUTO: 4.91 K/UL — SIGNIFICANT CHANGE UP (ref 1.8–7.4)
NEUTROPHILS NFR BLD AUTO: 62.7 % — SIGNIFICANT CHANGE UP (ref 43–77)
PHOSPHATE SERPL-MCNC: 4.1 MG/DL — SIGNIFICANT CHANGE UP (ref 2.5–4.5)
PLATELET # BLD AUTO: 268 K/UL — SIGNIFICANT CHANGE UP (ref 150–400)
POTASSIUM SERPL-MCNC: 3.9 MMOL/L — SIGNIFICANT CHANGE UP (ref 3.5–5.3)
POTASSIUM SERPL-SCNC: 3.9 MMOL/L — SIGNIFICANT CHANGE UP (ref 3.5–5.3)
PROT SERPL-MCNC: 6.9 G/DL — SIGNIFICANT CHANGE UP (ref 6–8.3)
RBC # BLD: 4.31 M/UL — SIGNIFICANT CHANGE UP (ref 4.2–5.8)
RBC # FLD: 12.6 % — SIGNIFICANT CHANGE UP (ref 10.3–14.5)
SODIUM SERPL-SCNC: 136 MMOL/L — SIGNIFICANT CHANGE UP (ref 135–145)
WBC # BLD: 7.84 K/UL — SIGNIFICANT CHANGE UP (ref 3.8–10.5)
WBC # FLD AUTO: 7.84 K/UL — SIGNIFICANT CHANGE UP (ref 3.8–10.5)

## 2019-05-10 PROCEDURE — 93010 ELECTROCARDIOGRAM REPORT: CPT

## 2019-05-10 PROCEDURE — 93572 IV DOP VEL&/PRESS C FLO EA: CPT | Mod: 26,LD,GC

## 2019-05-10 PROCEDURE — 93010 ELECTROCARDIOGRAM REPORT: CPT | Mod: 59

## 2019-05-10 PROCEDURE — 99232 SBSQ HOSP IP/OBS MODERATE 35: CPT | Mod: 25

## 2019-05-10 PROCEDURE — 93571 IV DOP VEL&/PRESS C FLO 1ST: CPT | Mod: 26,LM,GC

## 2019-05-10 PROCEDURE — 92928 PRQ TCAT PLMT NTRAC ST 1 LES: CPT | Mod: LD,GC

## 2019-05-10 PROCEDURE — 99152 MOD SED SAME PHYS/QHP 5/>YRS: CPT | Mod: GC

## 2019-05-10 PROCEDURE — 99233 SBSQ HOSP IP/OBS HIGH 50: CPT

## 2019-05-10 PROCEDURE — 93454 CORONARY ARTERY ANGIO S&I: CPT | Mod: 26,59,GC

## 2019-05-10 PROCEDURE — 99255 IP/OBS CONSLTJ NEW/EST HI 80: CPT

## 2019-05-10 RX ORDER — INSULIN LISPRO 100/ML
VIAL (ML) SUBCUTANEOUS AT BEDTIME
Refills: 0 | Status: DISCONTINUED | OUTPATIENT
Start: 2019-05-10 | End: 2019-05-13

## 2019-05-10 RX ORDER — INSULIN GLARGINE 100 [IU]/ML
30 INJECTION, SOLUTION SUBCUTANEOUS AT BEDTIME
Refills: 0 | Status: DISCONTINUED | OUTPATIENT
Start: 2019-05-10 | End: 2019-05-11

## 2019-05-10 RX ORDER — INSULIN LISPRO 100/ML
10 VIAL (ML) SUBCUTANEOUS
Refills: 0 | Status: DISCONTINUED | OUTPATIENT
Start: 2019-05-10 | End: 2019-05-13

## 2019-05-10 RX ORDER — ACETAMINOPHEN 500 MG
650 TABLET ORAL EVERY 6 HOURS
Refills: 0 | Status: DISCONTINUED | OUTPATIENT
Start: 2019-05-10 | End: 2019-05-13

## 2019-05-10 RX ORDER — LEVOTHYROXINE SODIUM 125 MCG
1 TABLET ORAL
Qty: 0 | Refills: 0 | DISCHARGE
Start: 2019-05-10

## 2019-05-10 RX ORDER — INSULIN LISPRO 100/ML
VIAL (ML) SUBCUTANEOUS
Refills: 0 | Status: DISCONTINUED | OUTPATIENT
Start: 2019-05-10 | End: 2019-05-13

## 2019-05-10 RX ADMIN — Medication 650 MILLIGRAM(S): at 14:00

## 2019-05-10 RX ADMIN — TICAGRELOR 90 MILLIGRAM(S): 90 TABLET ORAL at 17:41

## 2019-05-10 RX ADMIN — INSULIN GLARGINE 30 UNIT(S): 100 INJECTION, SOLUTION SUBCUTANEOUS at 00:08

## 2019-05-10 RX ADMIN — Medication 650 MILLIGRAM(S): at 21:15

## 2019-05-10 RX ADMIN — Medication 50 MILLIGRAM(S): at 05:10

## 2019-05-10 RX ADMIN — Medication 1: at 00:09

## 2019-05-10 RX ADMIN — INSULIN GLARGINE 30 UNIT(S): 100 INJECTION, SOLUTION SUBCUTANEOUS at 21:06

## 2019-05-10 RX ADMIN — Medication 10 UNIT(S): at 16:41

## 2019-05-10 RX ADMIN — Medication 1: at 06:43

## 2019-05-10 RX ADMIN — Medication 650 MILLIGRAM(S): at 15:00

## 2019-05-10 RX ADMIN — HEPARIN SODIUM 1150 UNIT(S)/HR: 5000 INJECTION INTRAVENOUS; SUBCUTANEOUS at 05:10

## 2019-05-10 RX ADMIN — Medication 650 MILLIGRAM(S): at 20:39

## 2019-05-10 RX ADMIN — Medication 5 MILLIGRAM(S): at 21:06

## 2019-05-10 RX ADMIN — Medication 75 MICROGRAM(S): at 05:10

## 2019-05-10 RX ADMIN — Medication 81 MILLIGRAM(S): at 10:07

## 2019-05-10 RX ADMIN — ATORVASTATIN CALCIUM 80 MILLIGRAM(S): 80 TABLET, FILM COATED ORAL at 21:06

## 2019-05-10 RX ADMIN — TICAGRELOR 90 MILLIGRAM(S): 90 TABLET ORAL at 05:10

## 2019-05-10 RX ADMIN — Medication 5: at 16:41

## 2019-05-10 NOTE — DISCHARGE NOTE PROVIDER - NSDCFUADDAPPT_GEN_ALL_CORE_FT
please follow up with your PMD and cardiology in 1 ~ 2 weeks. please follow up with your PMD and cardiology in 1 ~ 2 weeks.  You have a scheduled appointment with the outpatient diabetes educator on 6/27/19 at 11 am and   with Dr. Yolande Reid on 9/6/19 at 1 pm   Please call to schedule an appointment  with your ophthalmologist, podiatrist and nephrologist for prevention

## 2019-05-10 NOTE — PROGRESS NOTE ADULT - SUBJECTIVE AND OBJECTIVE BOX
Patient is a 54y old  Male who presents with a chief complaint of chest pain (10 May 2019 14:19)      INTERVAL HPI/OVERNIGHT EVENTS:  53 yo male with PMH of HTN, HLD, DM on insulin, hypothyroidism, CAD s/p ELSY x 2 to mRCA on 4/24/19, now on aspirin/brilinta, was sent here by cardiology for chest pain.    Patient was seen by the cardiology team and was scheduled for cardiac cath which was done today with report of mLAD 70 % stenosis s/p ELSY.         Medications:MEDICATIONS  (STANDING):  aspirin enteric coated 81 milliGRAM(s) Oral daily  atorvastatin 80 milliGRAM(s) Oral at bedtime  dextrose 5%. 1000 milliLiter(s) (50 mL/Hr) IV Continuous <Continuous>  dextrose 50% Injectable 12.5 Gram(s) IV Push once  dextrose 50% Injectable 25 Gram(s) IV Push once  dextrose 50% Injectable 25 Gram(s) IV Push once  enalapril 5 milliGRAM(s) Oral at bedtime  insulin glargine Injectable (LANTUS) 30 Unit(s) SubCutaneous at bedtime  insulin lispro (HumaLOG) corrective regimen sliding scale   SubCutaneous three times a day before meals  insulin lispro (HumaLOG) corrective regimen sliding scale   SubCutaneous at bedtime  insulin lispro Injectable (HumaLOG) 10 Unit(s) SubCutaneous three times a day before meals  levothyroxine 75 MICROGram(s) Oral daily  metoprolol succinate ER 50 milliGRAM(s) Oral daily  ticagrelor 90 milliGRAM(s) Oral two times a day    MEDICATIONS  (PRN):  acetaminophen   Tablet .. 650 milliGRAM(s) Oral every 6 hours PRN Temp greater or equal to 38C (100.4F), Moderate Pain (4 - 6)  dextrose 40% Gel 15 Gram(s) Oral once PRN Blood Glucose LESS THAN 70 milliGRAM(s)/deciliter  glucagon  Injectable 1 milliGRAM(s) IntraMuscular once PRN Glucose LESS THAN 70 milligrams/deciliter      Allergies: Allergies    No Known Allergies    Intolerances        REVIEW OF SYSTEMS:  CONSTITUTIONAL: No fever, weight loss, or fatigue  EYES: No eye pain, visual disturbances, or discharge  ENMT:  No difficulty hearing, tinnitus, vertigo; No sinus or throat pain  NECK: No pain or stiffness  BREASTS: No pain, masses, or nipple discharge  RESPIRATORY: No cough, wheezing, chills or hemoptysis; No shortness of breath  CARDIOVASCULAR: No chest pain, palpitations, dizziness, or leg swelling  GASTROINTESTINAL: No abdominal or epigastric pain. No nausea, vomiting, or hematemesis; No diarrhea or constipation. No melena or hematochezia.  GENITOURINARY: No dysuria, frequency, hematuria, or incontinence  NEUROLOGICAL: No headaches, memory loss, loss of strength, numbness, or tremors  SKIN: No itching, burning, rashes, or lesions   LYMPH NODES: No enlarged glands  ENDOCRINE: No heat or cold intolerance; No hair loss  MUSCULOSKELETAL: No joint pain or swelling; No muscle, back, or extremity pain  PSYCHIATRIC: No depression, anxiety, mood swings, or difficulty sleeping  HEME/LYMPH: No easy bruising, or bleeding gums  ALLERY AND IMMUNOLOGIC: No hives or eczema    T(C): 36.8 (05-10-19 @ 14:00), Max: 36.8 (05-10-19 @ 14:00)  HR: 78 (05-10-19 @ 14:00) (72 - 79)  BP: 161/92 (05-10-19 @ 14:00) (157/97 - 180/95)  RR: 18 (05-10-19 @ 14:00) (18 - 18)  SpO2: 98% (05-10-19 @ 14:00) (95% - 98%)  Wt(kg): --Vital Signs Last 24 Hrs  T(C): 36.8 (10 May 2019 14:00), Max: 36.8 (10 May 2019 14:00)  T(F): 98.2 (10 May 2019 14:00), Max: 98.2 (10 May 2019 14:00)  HR: 78 (10 May 2019 14:00) (72 - 79)  BP: 161/92 (10 May 2019 14:00) (157/97 - 180/95)  BP(mean): --  RR: 18 (10 May 2019 14:00) (18 - 18)  SpO2: 98% (10 May 2019 14:00) (95% - 98%)  I&O's Summary    09 May 2019 07:01  -  10 May 2019 07:00  --------------------------------------------------------  IN: 236.5 mL / OUT: 0 mL / NET: 236.5 mL    10 May 2019 07:01  -  10 May 2019 15:40  --------------------------------------------------------  IN: 23 mL / OUT: 0 mL / NET: 23 mL      Last Menstrual Period      PHYSICAL EXAM:  GENERAL: NAD, well-groomed, well-developed  HEAD:  Atraumatic, Normocephalic  EYES: EOMI, PERRLA, conjunctiva and sclera clear  ENMT: No tonsillar erythema, exudates, or enlargement; Moist mucous membranes, Good dentition, No lesions  NECK: Supple, No JVD, Normal thyroid  NERVOUS SYSTEM:  Alert & Oriented X3, Good concentration; Motor Strength 5/5 B/L upper and lower extremities; DTRs 2+ intact and symmetric  CHEST/LUNG: Clear to percussion bilaterally; No rales, rhonchi, wheezing, or rubs  HEART: Regular rate and rhythm; No murmurs, rubs, or gallops  ABDOMEN: Soft, Nontender, Nondistended; Bowel sounds present  EXTREMITIES:  2+ Peripheral Pulses, No clubbing, cyanosis, or edema  LYMPH: No lymphadenopathy noted  SKIN: No rashes or lesions    Consultant(s) Notes Reviewed:  [x ] YES  [ ] NO  Care Discussed with Consultants/Other Providers [ x] YES  [ ] NO  Name of Consultant  LABS:                        12.4   7.84  )-----------( 268      ( 10 May 2019 08:05 )             37.3     05-10    136  |  103  |  15  ----------------------------<  156<H>  3.9   |  23  |  0.62    Ca    9.5      10 May 2019 04:05  Phos  4.1     05-10  Mg     2.0     05-10    TPro  6.9  /  Alb  4.3  /  TBili  0.2  /  DBili  x   /  AST  34  /  ALT  37  /  AlkPhos  77  05-10    PT/INR - ( 09 May 2019 13:28 )   PT: 11.1 sec;   INR: 0.97 ratio         PTT - ( 10 May 2019 04:05 )  PTT:59.6 sec    CAPILLARY BLOOD GLUCOSE      POCT Blood Glucose.: 181 mg/dL (10 May 2019 06:37)  POCT Blood Glucose.: 191 mg/dL (09 May 2019 23:55)  POCT Blood Glucose.: 232 mg/dL (09 May 2019 21:10)            RADIOLOGY & ADDITIONAL TESTS:  EKG :     Imaging Personally Reviewed:  [ ] YES  [ ] NO  HEALTH ISSUES - PROBLEM Dx:  Acquired hypothyroidism: Acquired hypothyroidism  Hyperlipidemia, unspecified hyperlipidemia type: Hyperlipidemia, unspecified hyperlipidemia type  Essential hypertension: Essential hypertension  Type 2 diabetes mellitus with hyperglycemia, with long-term current use of insulin: Type 2 diabetes mellitus with hyperglycemia, with long-term current use of insulin  Hypothyroidism: Hypothyroidism  Prophylactic measure: Prophylactic measure  HLD (hyperlipidemia): HLD (hyperlipidemia)  Hypertension: Hypertension  Type 2 diabetes mellitus: Type 2 diabetes mellitus  CAD (coronary artery disease): CAD (coronary artery disease)  Chest pain: Chest pain Patient is a 54y old  Male who presents with a chief complaint of chest pain (10 May 2019 14:19)      INTERVAL HPI/OVERNIGHT EVENTS:  55 yo male with PMH of HTN, HLD, DM on insulin, hypothyroidism, CAD s/p ELSY x 2 to mRCA on 4/24/19, now on aspirin/brilinta, was sent here by cardiology for chest pain.    Patient was seen by the cardiology team and was scheduled for cardiac cath which was done today with report of mLAD 70 % stenosis s/p ELSY.         Medications:MEDICATIONS  (STANDING):  aspirin enteric coated 81 milliGRAM(s) Oral daily  atorvastatin 80 milliGRAM(s) Oral at bedtime  dextrose 5%. 1000 milliLiter(s) (50 mL/Hr) IV Continuous <Continuous>  dextrose 50% Injectable 12.5 Gram(s) IV Push once  dextrose 50% Injectable 25 Gram(s) IV Push once  dextrose 50% Injectable 25 Gram(s) IV Push once  enalapril 5 milliGRAM(s) Oral at bedtime  insulin glargine Injectable (LANTUS) 30 Unit(s) SubCutaneous at bedtime  insulin lispro (HumaLOG) corrective regimen sliding scale   SubCutaneous three times a day before meals  insulin lispro (HumaLOG) corrective regimen sliding scale   SubCutaneous at bedtime  insulin lispro Injectable (HumaLOG) 10 Unit(s) SubCutaneous three times a day before meals  levothyroxine 75 MICROGram(s) Oral daily  metoprolol succinate ER 50 milliGRAM(s) Oral daily  ticagrelor 90 milliGRAM(s) Oral two times a day    MEDICATIONS  (PRN):  acetaminophen   Tablet .. 650 milliGRAM(s) Oral every 6 hours PRN Temp greater or equal to 38C (100.4F), Moderate Pain (4 - 6)  dextrose 40% Gel 15 Gram(s) Oral once PRN Blood Glucose LESS THAN 70 milliGRAM(s)/deciliter  glucagon  Injectable 1 milliGRAM(s) IntraMuscular once PRN Glucose LESS THAN 70 milligrams/deciliter      Allergies: Allergies    No Known Allergies    Intolerances        REVIEW OF SYSTEMS:  CONSTITUTIONAL: No fever  NECK: No pain or stiffness  RESPIRATORY: No cough, wheezing, chills or hemoptysis; No shortness of breath  CARDIOVASCULAR: No current chest pain, palpitations, dizziness, or leg swelling  GASTROINTESTINAL: No abdominal or epigastric pain. No nausea, vomiting, or hematemesis; No diarrhea or constipation. No melena or hematochezia.  GENITOURINARY: No dysuria, frequency, hematuria, or incontinence  NEUROLOGICAL: No headache  LYMPH NODES: No enlarged glands  ENDOCRINE: No heat or cold intolerance; No hair loss      T(C): 36.8 (05-10-19 @ 14:00), Max: 36.8 (05-10-19 @ 14:00)  HR: 78 (05-10-19 @ 14:00) (72 - 79)  BP: 161/92 (05-10-19 @ 14:00) (157/97 - 180/95)  RR: 18 (05-10-19 @ 14:00) (18 - 18)  SpO2: 98% (05-10-19 @ 14:00) (95% - 98%)  Wt(kg): --Vital Signs Last 24 Hrs  T(C): 36.8 (10 May 2019 14:00), Max: 36.8 (10 May 2019 14:00)  T(F): 98.2 (10 May 2019 14:00), Max: 98.2 (10 May 2019 14:00)  HR: 78 (10 May 2019 14:00) (72 - 79)  BP: 161/92 (10 May 2019 14:00) (157/97 - 180/95)  BP(mean): --  RR: 18 (10 May 2019 14:00) (18 - 18)  SpO2: 98% (10 May 2019 14:00) (95% - 98%)  I&O's Summary    09 May 2019 07:01  -  10 May 2019 07:00  --------------------------------------------------------  IN: 236.5 mL / OUT: 0 mL / NET: 236.5 mL    10 May 2019 07:01  -  10 May 2019 15:40  --------------------------------------------------------  IN: 23 mL / OUT: 0 mL / NET: 23 mL        PHYSICAL EXAM:  GENERAL: NAD, well-groomed, well-developed  HEAD:  Atraumatic, Normocephalic  EYES: EOMI, PERRLA, conjunctiva and sclera clear  ENMT: No tonsillar erythema, exudates, or enlargement; Moist mucous membranes, Good dentition, No lesions  NECK: Supple, No JVD, Normal thyroid  NERVOUS SYSTEM:  Alert & Oriented X3, Good concentration  CHEST/LUNG: Clear to percussion bilaterally; No rales, rhonchi, wheezing, or rubs  HEART: Regular rate and rhythm; No murmurs, rubs, or gallops  ABDOMEN: Soft, Nontender, Nondistended; Bowel sounds present  Skin   : RT radial area with no hematoma     Consultant(s) Notes Reviewed:  [x ] YES  [ ] NO  Care Discussed with Consultants/Other Providers [ x] YES  [ ] NO  Name of Consultant  LABS:                        12.4   7.84  )-----------( 268      ( 10 May 2019 08:05 )             37.3     05-10    136  |  103  |  15  ----------------------------<  156<H>  3.9   |  23  |  0.62    Ca    9.5      10 May 2019 04:05  Phos  4.1     05-10  Mg     2.0     05-10    TPro  6.9  /  Alb  4.3  /  TBili  0.2  /  DBili  x   /  AST  34  /  ALT  37  /  AlkPhos  77  05-10    PT/INR - ( 09 May 2019 13:28 )   PT: 11.1 sec;   INR: 0.97 ratio         PTT - ( 10 May 2019 04:05 )  PTT:59.6 sec    CAPILLARY BLOOD GLUCOSE      POCT Blood Glucose.: 181 mg/dL (10 May 2019 06:37)  POCT Blood Glucose.: 191 mg/dL (09 May 2019 23:55)  POCT Blood Glucose.: 232 mg/dL (09 May 2019 21:10)            RADIOLOGY & ADDITIONAL TESTS:  EKG :     Imaging Personally Reviewed:  [ ] YES  [ ] NO  HEALTH ISSUES - PROBLEM Dx:  Acquired hypothyroidism: Acquired hypothyroidism  Hyperlipidemia, unspecified hyperlipidemia type: Hyperlipidemia, unspecified hyperlipidemia type  Essential hypertension: Essential hypertension  Type 2 diabetes mellitus with hyperglycemia, with long-term current use of insulin: Type 2 diabetes mellitus with hyperglycemia, with long-term current use of insulin  Hypothyroidism: Hypothyroidism  Prophylactic measure: Prophylactic measure  HLD (hyperlipidemia): HLD (hyperlipidemia)  Hypertension: Hypertension  Type 2 diabetes mellitus: Type 2 diabetes mellitus  CAD (coronary artery disease): CAD (coronary artery disease)  Chest pain: Chest pain

## 2019-05-10 NOTE — CONSULT NOTE ADULT - PROBLEM SELECTOR RECOMMENDATION 9
Diabetes Education and Nutrition Eval  Pt. no longer NPO.  Increase Lantus to 34 units qhs  Start Humalog 10 units qac  Low correction scale qac + bedtime  Goal glucose 100-180  Outpt. endo follow-up. Appointment scheduled with our diabetes educator on 6/27 at 11am and Dr. Yolande Reid 9/6 at 1pm. If patient cannot make these appointments he should call [phone] to reschedule.   Outpt. optho, podiatry, nephrology  Plan to d/c on basal bolus

## 2019-05-10 NOTE — DISCHARGE NOTE PROVIDER - HOSPITAL COURSE
55 yo male with PMH of HTN, HLD, DM on insulin, hypothyroidism, CAD s/p ELSY x 2 to mRCA on 4/24/19, now on aspirin/brilinta, sent here by cardiology for chest pain to r/o ACS. negative trop. started hep gtt, s/p cardiac cath, ELSY to LAD x2      cw aspirin, brilinta, atorvastatin, BB, enalapril. hx of T2DM on lantus, sliding scale.     pt is hemodynamically stable to d/c  home.

## 2019-05-10 NOTE — DISCHARGE NOTE PROVIDER - PROVIDER TOKENS
FREE:[LAST:[Dr. Reid],FIRST:[Yolande],PHONE:[phone]],FAX:[(   )    -],ADDRESS:[[facility] [place] [address]],FOLLOWUP:[1 month]],PROVIDER:[TOKEN:[[id number]],FOLLOWUP:[1 week]]

## 2019-05-10 NOTE — CONSULT NOTE ADULT - ASSESSMENT
55 y/o M w/ uncontrolled Type 2 DM w/ neuropathy, nephropathy and CAD w/ hyperglycemia A1c 9%, HTN, HLD, Hypothyroidism admitted with chest pain s/p PCI (high risk patient with high level decision-making).

## 2019-05-10 NOTE — PROGRESS NOTE ADULT - PROBLEM SELECTOR PLAN 3
Cont to monitor blood glucose   A1C = 9.0   stat lantus 34u bedtime and Humalog 10 units before meals

## 2019-05-10 NOTE — CONSULT NOTE ADULT - SUBJECTIVE AND OBJECTIVE BOX
HPI:  53 y/o M w/ Type 2 DM dx 2010 with symptoms of polyuria and polydipsia sent to the ER with glucose of >700. Started on insulin with only Lantus. Admelog 10-15 units qac added 6 weeks ago. + neuropathy and nephropathy. Macrovascular complications of CAD s/p PCI. No recent optho. No reported retinopathy. Glucose values at home generally 200-300's. No reported hypoglycemia or symptoms of hypoglycemia. Generally adherent to insulin. Injects in abdomen and rotates sites. Diet uncontrolled. Eats a lot of fast food and carbs. Drinks diet soda. +nocturia. No nausea or vomiting. Father with Type 2 DM.     Hypothyroidism on levothyroxine 75 mcg daily in the evening. No reported head or neck surgeries or radiation. + heat intolerance. No change to weight. No palpitations or tremors.     Also hx of HTN, HLD, CAD s/p ELSY x 2 to mRCA on 19, now on aspirin/brilinta, sent here by cardiology for chest pain.  Patient states that he had left sided chest pain for almost tow months.  He describes the pain as nonradiating, associated with diaphoresis, SOB, worse with activities or emotional stress, relieved with aspirin or rest.  Pain is reproducible with palpation of the left lower ribs.  Patient initially had nuclear strest test which reveealed inferoseptal defect, LVEF 43%.  He was admitted for cardiac cath on 19, had ELSY x 2 to mRCA.   After discharged on 19, patient continued to have same pain.  He went to another cardiologist close to his house, who sent him for a nuclear stress test, which was done on Tuesday, and showed "mild dilated LV with transient ischemic dilation and post stress decrease in overall LV function especially the septum and anterior wall".  Patient went for follow up today with his cardiologist, and was sent here for persistent chest pain.  Patient otherwise denies SOB at rest, denies orthopnea or PND.  Currently he says he feels comfortable, without any chest pain. (09 May 2019 22:08)      PAST MEDICAL & SURGICAL HISTORY:  Hypothyroidism  CAD (coronary artery disease): s/p stents  Hypertension  HLD (hyperlipidemia)  Type 2 diabetes mellitus  H/O percutaneous left heart catheterization      FAMILY HISTORY:  FHx: diabetes mellitus: Father  Family history of heart disease (Grandparent): @aget 74      Social History: Used to drink a few beers daily now stopped. No tobacco use    Outpatient Medications:  · 	ticagrelor 90 mg oral tablet: 1 tab(s) orally 2 times a day, Last Dose Taken:    · 	Crestor 20 mg oral tablet: 1 tab(s) orally once a day (at bedtime), Last Dose Taken:    · 	Admelog SoloStar 100 units/mL injectable solution: 10 to 15 unit(s) subcutaneous 3 times a day (before meals), Last Dose Taken:    · 	aspirin 81 mg oral tablet: 1 tab(s) orally once a day (at bedtime), Last Dose Taken:    · 	enalapril 5 mg oral tablet: 1 tab(s) orally once a day (at bedtime), Last Dose Taken:    · 	Lantus Solostar Pen 100 units/mL subcutaneous solution: 40 unit(s) subcutaneous once a day (at bedtime), Last Dose Taken:    · 	levothyroxine 75 mcg (0.075 mg) oral tablet: 1 tab(s) orally once a day (at bedtime)  	  	***see internal merritt***, Last Dose Taken:    · 	Metoprolol Succinate ER 50 mg oral tablet, extended release: 1 tab(s) orally once a day (at bedtime), Last Dose Taken:    · 	Vitamin D2 50,000 intl units (1.25 mg) oral capsule: 1 cap(s) orally once a week on , Last Dose Taken:    · 	Slo-Niacin 500 mg oral tablet, extended release: 1 tab(s) orally once a day (at bedtime), Last Dose Taken:    · 	Chromium GTF: Last Dose Taken:    · 	Flonase 50 mcg/inh nasal spray: 1 spray(s) nasal , As Needed, Last Dose Taken:    · 	loratadine 10 mg oral tablet, disintegratin tab(s) orally once a day (at bedtime), Last Dose Taken:    · 	hydrocortisone topical: Apply topically to affected area , As Needed, Last Dose Taken:      MEDICATIONS  (STANDING):  aspirin enteric coated 81 milliGRAM(s) Oral daily  atorvastatin 80 milliGRAM(s) Oral at bedtime  dextrose 5%. 1000 milliLiter(s) (50 mL/Hr) IV Continuous <Continuous>  dextrose 50% Injectable 12.5 Gram(s) IV Push once  dextrose 50% Injectable 25 Gram(s) IV Push once  dextrose 50% Injectable 25 Gram(s) IV Push once  enalapril 5 milliGRAM(s) Oral at bedtime  insulin glargine Injectable (LANTUS) 30 Unit(s) SubCutaneous at bedtime  insulin lispro (HumaLOG) corrective regimen sliding scale   SubCutaneous every 6 hours  levothyroxine 75 MICROGram(s) Oral daily  metoprolol succinate ER 50 milliGRAM(s) Oral daily  ticagrelor 90 milliGRAM(s) Oral two times a day    MEDICATIONS  (PRN):  acetaminophen   Tablet .. 650 milliGRAM(s) Oral every 6 hours PRN Temp greater or equal to 38C (100.4F), Moderate Pain (4 - 6)  dextrose 40% Gel 15 Gram(s) Oral once PRN Blood Glucose LESS THAN 70 milliGRAM(s)/deciliter  glucagon  Injectable 1 milliGRAM(s) IntraMuscular once PRN Glucose LESS THAN 70 milligrams/deciliter      Allergies    No Known Allergies    Intolerances      Review of Systems:  Constitutional: No fever, No change in weight  Eyes: +blurry vision  Neuro: No headache, +neuropathy  HEENT: No throat pain  Cardiovascular: +improved chest pain  Respiratory: No SOB  GI: No nausea or vomiting  : +nocturia  Skin: no rash  Psych: no depression  Endocrine: No polydipsia, No heat or cold intolerance, rest as noted in HPI  Hem/lymph: no swelling    All other review of systems negative      PHYSICAL EXAM:  VITALS: T(C): 36.8 (05-10-19 @ 14:00)  T(F): 98.2 (05-10-19 @ 14:00), Max: 98.2 (05-10-19 @ 14:00)  HR: 78 (05-10-19 @ 14:00) (67 - 79)  BP: 161/92 (05-10-19 @ 14:00) (138/89 - 180/95)  RR:  (16 - 18)  SpO2:  (95% - 100%)  Wt(kg): --  GENERAL: NAD at this time  EYES: No proptosis, EOMI  HEENT:  Atraumatic, Normocephalic,   THYROID: Normal size, no palpable nodules  RESPIRATORY: Clear to auscultation bilaterally, full excursion, non-labored  CARDIOVASCULAR: Regular rhythm; No murmurs; no peripheral edema  GI: Soft, nontender, non distended, normal bowel sounds  SKIN: Dry, intact +healed poison ivy on LE  MUSCULOSKELETAL: normal strength  NEURO: follows commands  PSYCH: Alert and oriented x 3, normal affect, normal mood  CUSHING'S SIGNS: no striae      POCT Blood Glucose.: 181 mg/dL (05-10-19 @ 06:37)  POCT Blood Glucose.: 191 mg/dL (19 @ 23:55)  POCT Blood Glucose.: 232 mg/dL (19 @ 21:10)  POCT Blood Glucose.: 194 mg/dL (19 @ 13:36)                              12.4   7.84  )-----------( 268      ( 10 May 2019 08:05 )             37.3       05-10    136  |  103  |  15  ----------------------------<  156<H>  3.9   |  23  |  0.62    EGFR if : 131  EGFR if non : 113    Ca    9.5      05-10  Mg     2.0     05-10  Phos  4.1     05-10    TPro  6.9  /  Alb  4.3  /  TBili  0.2  /  DBili  x   /  AST  34  /  ALT  37  /  AlkPhos  77  05-10    Thyroid Function Tests:      Hemoglobin A1C, Whole Blood: 9.0 % <H> [4.0 - 5.6] (05-10-19 @ 01:03)  Hemoglobin A1C, Whole Blood: 8.9 % <H> [4.0 - 5.6] (19 @ 03:04)        Radiology:

## 2019-05-10 NOTE — DISCHARGE NOTE PROVIDER - NSDCCPCAREPLAN_GEN_ALL_CORE_FT
PRINCIPAL DISCHARGE DIAGNOSIS  Diagnosis: Chest pain  Assessment and Plan of Treatment: cardiac enzymes are negative   EKG w/ normal sinus rhythm.   s/p cardiac cath,   HOME CARE INSTRUCTIONS  For the next few days, avoid physical activities that bring on chest pain. Continue physical activities as directed.  Do not smoke.  Avoid drinking alcohol.   Only take over-the-counter or prescription medicine for pain, discomfort, or fever as directed by your caregiver.  Follow your caregiver's suggestions for further testing if your chest pain does not go away.  Keep any follow-up appointments you made. If you do not go to an appointment, you could develop lasting (chronic) problems with pain. If there is any problem keeping an appointment, you must call to reschedule.   SEEK MEDICAL CARE IF:  You think you are having problems from the medicine you are taking. Read your medicine instructions carefully.  Your chest pain does not go away, even after treatment.  You develop a rash with blisters on your chest.  SEEK IMMEDIATE MEDICAL CARE IF:  You have increased chest pain or pain that spreads to your arm, neck, jaw, back, or abdomen.   You develop shortness of breath, an increasing cough, or you are coughing up blood.  You have severe back or abdominal pain, feel nauseous, or vomit.  You develop severe weakness, fainting, or chills.  You have a fever.        SECONDARY DISCHARGE DIAGNOSES  Diagnosis: Type 2 diabetes mellitus  Assessment and Plan of Treatment: HgA1C this admission 9.0.  Make sure you get your HgA1c checked every three months.  If you take oral diabetes medications, check your blood glucose two times a day.  If you take insulin, check your blood glucose before meals and at bedtime.  It's important not to skip any meals.  Keep a log of your blood glucose results and always take it with you to your doctor appointments.  Keep a list of your current medications including injectables and over the counter medications and bring this medication list with you to all your doctor appointments.  If you have not seen your ophthalmologist this year call for appointment.  Check your feet daily for redness, sores, or openings. Do not self treat. If no improvement in two days call your primary care physician for an appointment.  Low blood sugar (hypoglycemia) is a blood sugar below 70mg/dl. Check your blood sugar if you feel signs/symptoms of hypoglycemia. If your blood sugar is below 70 take 15 grams of carbohydrates (ex 4 oz of apple juice, 3-4 glucose tablets, or 4-6 oz of regular soda) wait 15 minutes and repeat blood sugar to make sure it comes up above 70.  If your blood sugar is above 70 and you are due for a meal, have a meal.  If you are not due for a meal have a snack.  This snack helps keeps your blood sugar at a safe range.      Diagnosis: Hypothyroidism  Assessment and Plan of Treatment: continue with synthroid.    Diagnosis: HTN (hypertension)  Assessment and Plan of Treatment: Low salt diet  Activity as tolerated.  Take all medication as prescribed.  Follow up with your medical doctor for routine blood pressure monitoring at your next visit.  Notify your doctor if you have any of the following symptoms:   Dizziness, Lightheadedness, Blurry vision, Headache, Chest pain, Shortness of breath      Diagnosis: HLD (hyperlipidemia)  Assessment and Plan of Treatment: Follow up with PCP for treatment goals, continue medication, have liver function testing every 3 months as anti lipid medications can cause liver irritation, eat low fat, low cholesterol meals      Diagnosis: CAD (coronary artery disease)  Assessment and Plan of Treatment: Coronary artery disease is a condition where the arteries the supply the heart muscle get clogges with fatty deposits & puts you at risk for a heart attack  Call your doctor if you have any new pain, pressure, or discomfort in the center of your chest, pain, tingling or discomfort in arms, back, neck, jaw, or stomach, shortness of breath, nausea, vomiting, burping or heartburn, sweating, cold and clammy skin, racing or abnormal heartbeat for more than 10 minutes or if they keep coming & going.  Call 911 and do not tr to get to hospital by care  You can help yourself with lefestyle changes (quitting smoking if you smoke), eat lots of fruits & vegetables & low fat dairy products, not a lot of meat & fatty foods, walk or some form of physical activity most days of the week, lose weight if you are overweight  Take your cardiac medication as prescribed to lower cholesterol, to lower blood pressure, aspirin to prevent blood clots, and diabetes control  Make sure to keep appointments with doctor for cardiac follow up care

## 2019-05-10 NOTE — DISCHARGE NOTE PROVIDER - NSDCCAREPROVSEEN_GEN_ALL_CORE_FT
Jonah Whitlock  [facility] Medicine, Advance PracticeTeam  [facility] Endocrinology, Team

## 2019-05-10 NOTE — PROGRESS NOTE ADULT - SUBJECTIVE AND OBJECTIVE BOX
Cardiology Follow Up  ==========================================  CC: DM, CAD, Chest pain    HPI: S/p cath with PCI to LAD. Denies cardiac pain.     Review Of Systems:  Negative except as documented above    Medications:  acetaminophen   Tablet ..   650 milliGRAM(s) Oral (05-10-19 @ 20:39)   650 milliGRAM(s) Oral (05-10-19 @ 14:00)    aspirin enteric coated   81 milliGRAM(s) Oral (05-10-19 @ 10:07)    atorvastatin   80 milliGRAM(s) Oral (05-10-19 @ 21:06)    enalapril   5 milliGRAM(s) Oral (05-10-19 @ 21:06)    heparin  Infusion.   1150 Unit(s)/Hr IV Continuous (05-09-19 @ 21:44)    insulin glargine Injectable (LANTUS)   30 Unit(s) SubCutaneous (05-10-19 @ 00:08)    insulin glargine Injectable (LANTUS)   30 Unit(s) SubCutaneous (05-10-19 @ 21:06)    insulin lispro (HumaLOG) corrective regimen sliding scale   5 Unit(s) SubCutaneous (05-10-19 @ 16:41)    insulin lispro (HumaLOG) corrective regimen sliding scale   1 Unit(s) SubCutaneous (05-10-19 @ 06:43)   1 Unit(s) SubCutaneous (05-10-19 @ 00:09)    insulin lispro Injectable (HumaLOG)   10 Unit(s) SubCutaneous (05-10-19 @ 16:41)    levothyroxine   75 MICROGram(s) Oral (05-10-19 @ 05:10)    metoprolol succinate ER   50 milliGRAM(s) Oral (05-10-19 @ 05:10)    ticagrelor   90 milliGRAM(s) Oral (05-10-19 @ 17:41)   90 milliGRAM(s) Oral (05-10-19 @ 05:10)        Telemetry: No significant ectopy     Vital Signs Last 24 Hrs  T(C): 36.7 (10 May 2019 20:43), Max: 36.8 (10 May 2019 14:00)  T(F): 98 (10 May 2019 20:43), Max: 98.2 (10 May 2019 14:00)  HR: 79 (10 May 2019 20:43) (76 - 79)  BP: 178/98 (10 May 2019 20:43) (157/97 - 180/95)  BP(mean): --  RR: 18 (10 May 2019 20:43) (18 - 18)  SpO2: 98% (10 May 2019 20:43) (96% - 98%)  I&O's Summary    09 May 2019 07:01  -  10 May 2019 07:00  --------------------------------------------------------  IN: 236.5 mL / OUT: 0 mL / NET: 236.5 mL    10 May 2019 07:01  -  10 May 2019 21:37  --------------------------------------------------------  IN: 23 mL / OUT: 0 mL / NET: 23 mL        Physical Exam:  General: [x ] NAD  HEENT: [x ] EOMI [ x] PERRL  Cor: [x ] S1,S2 [x ] RRR [x ] No M/R/G   Lungs: [x ] CTA B/L [ x] Normal effort  Abd: [x ] Soft [x ] Non-tender [x ] +BS  Ext: [ x] No edema [x ] No cyanosis    Labs:                        12.4   7.84  )-----------( 268      ( 10 May 2019 08:05 )             37.3     05-10    136  |  103  |  15  ----------------------------<  156<H>  3.9   |  23  |  0.62    Ca    9.5      10 May 2019 04:05  Phos  4.1     05-10  Mg     2.0     05-10    TPro  6.9  /  Alb  4.3  /  TBili  0.2  /  DBili  x   /  AST  34  /  ALT  37  /  AlkPhos  77  05-10    Troponin T, High Sensitivity Result: 7 ng/L (05-09-19 @ 14:52)  Troponin T, High Sensitivity Result: 8 ng/L (05-09-19 @ 13:28)

## 2019-05-11 LAB
ANION GAP SERPL CALC-SCNC: 16 MMOL/L — SIGNIFICANT CHANGE UP (ref 5–17)
BUN SERPL-MCNC: 13 MG/DL — SIGNIFICANT CHANGE UP (ref 7–23)
CALCIUM SERPL-MCNC: 9.6 MG/DL — SIGNIFICANT CHANGE UP (ref 8.4–10.5)
CHLORIDE SERPL-SCNC: 101 MMOL/L — SIGNIFICANT CHANGE UP (ref 96–108)
CK MB BLD-MCNC: 5.1 % — HIGH (ref 0–3.5)
CK MB CFR SERPL CALC: 3.6 NG/ML — SIGNIFICANT CHANGE UP (ref 0–6.7)
CK SERPL-CCNC: 70 U/L — SIGNIFICANT CHANGE UP (ref 30–200)
CO2 SERPL-SCNC: 22 MMOL/L — SIGNIFICANT CHANGE UP (ref 22–31)
CREAT SERPL-MCNC: 0.64 MG/DL — SIGNIFICANT CHANGE UP (ref 0.5–1.3)
GLUCOSE BLDC GLUCOMTR-MCNC: 122 MG/DL — HIGH (ref 70–99)
GLUCOSE BLDC GLUCOMTR-MCNC: 164 MG/DL — HIGH (ref 70–99)
GLUCOSE BLDC GLUCOMTR-MCNC: 199 MG/DL — HIGH (ref 70–99)
GLUCOSE BLDC GLUCOMTR-MCNC: 210 MG/DL — HIGH (ref 70–99)
GLUCOSE SERPL-MCNC: 197 MG/DL — HIGH (ref 70–99)
HCT VFR BLD CALC: 38.1 % — LOW (ref 39–50)
HGB BLD-MCNC: 12.5 G/DL — LOW (ref 13–17)
MCHC RBC-ENTMCNC: 28.8 PG — SIGNIFICANT CHANGE UP (ref 27–34)
MCHC RBC-ENTMCNC: 32.8 GM/DL — SIGNIFICANT CHANGE UP (ref 32–36)
MCV RBC AUTO: 87.8 FL — SIGNIFICANT CHANGE UP (ref 80–100)
PLATELET # BLD AUTO: 250 K/UL — SIGNIFICANT CHANGE UP (ref 150–400)
POTASSIUM SERPL-MCNC: 4.2 MMOL/L — SIGNIFICANT CHANGE UP (ref 3.5–5.3)
POTASSIUM SERPL-SCNC: 4.2 MMOL/L — SIGNIFICANT CHANGE UP (ref 3.5–5.3)
RBC # BLD: 4.34 M/UL — SIGNIFICANT CHANGE UP (ref 4.2–5.8)
RBC # FLD: 12.8 % — SIGNIFICANT CHANGE UP (ref 10.3–14.5)
SODIUM SERPL-SCNC: 139 MMOL/L — SIGNIFICANT CHANGE UP (ref 135–145)
TROPONIN T, HIGH SENSITIVITY RESULT: 37 NG/L — SIGNIFICANT CHANGE UP (ref 0–51)
WBC # BLD: 8.12 K/UL — SIGNIFICANT CHANGE UP (ref 3.8–10.5)
WBC # FLD AUTO: 8.12 K/UL — SIGNIFICANT CHANGE UP (ref 3.8–10.5)

## 2019-05-11 PROCEDURE — 93010 ELECTROCARDIOGRAM REPORT: CPT | Mod: 77

## 2019-05-11 PROCEDURE — 93010 ELECTROCARDIOGRAM REPORT: CPT

## 2019-05-11 PROCEDURE — 99232 SBSQ HOSP IP/OBS MODERATE 35: CPT

## 2019-05-11 PROCEDURE — 99233 SBSQ HOSP IP/OBS HIGH 50: CPT

## 2019-05-11 PROCEDURE — 71110 X-RAY EXAM RIBS BIL 3 VIEWS: CPT | Mod: 26

## 2019-05-11 PROCEDURE — 93306 TTE W/DOPPLER COMPLETE: CPT | Mod: 26

## 2019-05-11 RX ORDER — INSULIN GLARGINE 100 [IU]/ML
34 INJECTION, SOLUTION SUBCUTANEOUS AT BEDTIME
Refills: 0 | Status: DISCONTINUED | OUTPATIENT
Start: 2019-05-11 | End: 2019-05-13

## 2019-05-11 RX ORDER — HEPARIN SODIUM 5000 [USP'U]/ML
5000 INJECTION INTRAVENOUS; SUBCUTANEOUS EVERY 8 HOURS
Refills: 0 | Status: DISCONTINUED | OUTPATIENT
Start: 2019-05-11 | End: 2019-05-13

## 2019-05-11 RX ADMIN — HEPARIN SODIUM 5000 UNIT(S): 5000 INJECTION INTRAVENOUS; SUBCUTANEOUS at 15:58

## 2019-05-11 RX ADMIN — Medication 10 UNIT(S): at 07:58

## 2019-05-11 RX ADMIN — Medication 650 MILLIGRAM(S): at 16:01

## 2019-05-11 RX ADMIN — TICAGRELOR 90 MILLIGRAM(S): 90 TABLET ORAL at 18:37

## 2019-05-11 RX ADMIN — Medication 5 MILLIGRAM(S): at 21:53

## 2019-05-11 RX ADMIN — TICAGRELOR 90 MILLIGRAM(S): 90 TABLET ORAL at 05:40

## 2019-05-11 RX ADMIN — Medication 10 UNIT(S): at 16:26

## 2019-05-11 RX ADMIN — Medication 650 MILLIGRAM(S): at 07:05

## 2019-05-11 RX ADMIN — ATORVASTATIN CALCIUM 80 MILLIGRAM(S): 80 TABLET, FILM COATED ORAL at 21:53

## 2019-05-11 RX ADMIN — Medication 75 MICROGRAM(S): at 05:40

## 2019-05-11 RX ADMIN — Medication 81 MILLIGRAM(S): at 12:10

## 2019-05-11 RX ADMIN — Medication 1: at 12:08

## 2019-05-11 RX ADMIN — HEPARIN SODIUM 5000 UNIT(S): 5000 INJECTION INTRAVENOUS; SUBCUTANEOUS at 21:53

## 2019-05-11 RX ADMIN — Medication 1: at 07:57

## 2019-05-11 RX ADMIN — Medication 650 MILLIGRAM(S): at 17:00

## 2019-05-11 RX ADMIN — INSULIN GLARGINE 34 UNIT(S): 100 INJECTION, SOLUTION SUBCUTANEOUS at 21:54

## 2019-05-11 RX ADMIN — Medication 50 MILLIGRAM(S): at 05:40

## 2019-05-11 RX ADMIN — Medication 650 MILLIGRAM(S): at 06:32

## 2019-05-11 RX ADMIN — Medication 10 UNIT(S): at 12:08

## 2019-05-11 NOTE — CHART NOTE - NSCHARTNOTEFT_GEN_A_CORE
Post Removal of Radial Band Site check    53 y/o male s/p PCI/ELSY x2 LAD 70% (+IFR) via RRA      Dressing removed Pulses in the right upper extremity are palpable.  No evidence of hematoma/bleeding.        Complications: None    Comments:  Activity and restrictions reviewed.  Pt stated having left sided lower rib pain that is reproducible non-exertional, non-radiating.  Pt denies presenting symptoms of SOB and Left arm pain. CE x 1 negative with no ECG changes. His symptoms do not appear to be cardiac in nature.      Ashely Ridley, NP-C  Cardiology
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
## 2019-05-11 NOTE — PROGRESS NOTE ADULT - SUBJECTIVE AND OBJECTIVE BOX
CARDIOLOGY WEEKEND COVERAGE NOTE    CC: Chest pain s/p PCI    Interval History: Complaining of left sided, pain under rib cage, tender to palpation.    MEDICATIONS:  acetaminophen   Tablet .. 650 milliGRAM(s) Oral every 6 hours PRN  aspirin enteric coated 81 milliGRAM(s) Oral daily  atorvastatin 80 milliGRAM(s) Oral at bedtime  dextrose 40% Gel 15 Gram(s) Oral once PRN  dextrose 5%. 1000 milliLiter(s) IV Continuous <Continuous>  dextrose 50% Injectable 12.5 Gram(s) IV Push once  dextrose 50% Injectable 25 Gram(s) IV Push once  dextrose 50% Injectable 25 Gram(s) IV Push once  enalapril 5 milliGRAM(s) Oral at bedtime  glucagon  Injectable 1 milliGRAM(s) IntraMuscular once PRN  heparin  Injectable 5000 Unit(s) SubCutaneous every 8 hours  insulin glargine Injectable (LANTUS) 34 Unit(s) SubCutaneous at bedtime  insulin lispro (HumaLOG) corrective regimen sliding scale   SubCutaneous three times a day before meals  insulin lispro (HumaLOG) corrective regimen sliding scale   SubCutaneous at bedtime  insulin lispro Injectable (HumaLOG) 10 Unit(s) SubCutaneous three times a day before meals  levothyroxine 75 MICROGram(s) Oral daily  metoprolol succinate ER 50 milliGRAM(s) Oral daily  ticagrelor 90 milliGRAM(s) Oral two times a day      LABS:  05-11    139  |  101  |  13  ----------------------------<  197<H>  4.2   |  22  |  0.64    Ca    9.6      11 May 2019 06:43  Phos  4.1     05-10  Mg     2.0     05-10    TPro  6.9  /  Alb  4.3  /  TBili  0.2  /  DBili  x   /  AST  34  /  ALT  37  /  AlkPhos  77  05-10                          12.5   8.12  )-----------( 250      ( 11 May 2019 10:18 )             38.1     PTT - ( 10 May 2019 04:05 )  PTT:59.6 sec  CARDIAC MARKERS ( 11 May 2019 11:18 )  x     / x     / 70 U/L / x     / 3.6 ng/mL          VITAL SIGNS:   T(C): 36.7 (05-11-19 @ 11:51), Max: 36.9 (05-11-19 @ 09:48)  HR: 79 (05-11-19 @ 11:51) (77 - 80)  BP: 163/94 (05-11-19 @ 11:51) (129/85 - 178/98)  RR: 18 (05-11-19 @ 11:51) (18 - 18)  SpO2: 98% (05-11-19 @ 11:51) (96% - 98%)  Daily     Daily   I&O's Summary    10 May 2019 07:01  -  11 May 2019 07:00  --------------------------------------------------------  IN: 463 mL / OUT: 0 mL / NET: 463 mL    11 May 2019 07:01  -  11 May 2019 14:26  --------------------------------------------------------  IN: 120 mL / OUT: 0 mL / NET: 120 mL      TELE: SR    PHYSICAL EXAM  Gen: Awake, alert, NAD  HEENT: NC/AT, no JVD  Chest: CTA b/l.  Under left rib cage border--point tenderness to palpation.  CV: Regular, S1, S2  Abd: Soft, N/D  Ext: No LE edema.    Cath (5/10/2019):  LM:   --  Distal left main: There was a 30 % stenosis.  LAD:   --  Mid LAD: There was a 70 % stenosis.  CX:   --  Circumflex: Angiography showed minor luminal irregularities with  no flow limiting lesions.  RCA:   --  Proximal RCA: There was a 0 % stenosis at the site of a prior  stent.

## 2019-05-11 NOTE — PROGRESS NOTE ADULT - PROBLEM SELECTOR PLAN 3
Cont to monitor blood glucose   A1C = 9.0   CW lantus 34u bedtime and Humalog 10 units before meals  AS per ENdocrine team : Outpt. endo follow-up. Appointment scheduled with our diabetes educator on 6/27 at 11am and Dr. Yolande Reid 9/6 at 1pm. If patient cannot make these appointments he should call [phone] to reschedule.

## 2019-05-11 NOTE — PROGRESS NOTE ADULT - PROBLEM SELECTOR PLAN 1
S/P LHC with ELSY to mLAD ( 70%)   CW ASA, Brilinta, Atorvastatin, Lisinopril, Metoprolol  Pt was seen by cardio team on 5/10 and ok for home dc today S/P LHC with ELSY to mLAD ( 70%)   CW ASA, Brilinta, Atorvastatin, Lisinopril, Metoprolol S/P LHC with ELSY to mLAD ( 70%)   CW ASA, Brilinta, Atorvastatin, Lisinopril, Metoprolol  pos recurrence of chest pain : Case discussed with the cardiologist and will get ECG/ cardiac enzymes and TTE stat

## 2019-05-11 NOTE — PROGRESS NOTE ADULT - ATTENDING COMMENTS
Patient will be today and will f/up with PCP / Dr Melgar and Cardiologist within one week of hosp dc.  DC time 40 mns.      jorden [facility]    jorden Tesfaye   Hospitalist

## 2019-05-12 ENCOUNTER — TRANSCRIPTION ENCOUNTER (OUTPATIENT)
Age: 54
End: 2019-05-12

## 2019-05-12 LAB
GLUCOSE BLDC GLUCOMTR-MCNC: 119 MG/DL — HIGH (ref 70–99)
GLUCOSE BLDC GLUCOMTR-MCNC: 124 MG/DL — HIGH (ref 70–99)
GLUCOSE BLDC GLUCOMTR-MCNC: 142 MG/DL — HIGH (ref 70–99)
GLUCOSE BLDC GLUCOMTR-MCNC: 257 MG/DL — HIGH (ref 70–99)
HCT VFR BLD CALC: 39.1 % — SIGNIFICANT CHANGE UP (ref 39–50)
HGB BLD-MCNC: 13 G/DL — SIGNIFICANT CHANGE UP (ref 13–17)
MCHC RBC-ENTMCNC: 29.2 PG — SIGNIFICANT CHANGE UP (ref 27–34)
MCHC RBC-ENTMCNC: 33.2 GM/DL — SIGNIFICANT CHANGE UP (ref 32–36)
MCV RBC AUTO: 87.9 FL — SIGNIFICANT CHANGE UP (ref 80–100)
PLATELET # BLD AUTO: 292 K/UL — SIGNIFICANT CHANGE UP (ref 150–400)
RBC # BLD: 4.45 M/UL — SIGNIFICANT CHANGE UP (ref 4.2–5.8)
RBC # FLD: 12.7 % — SIGNIFICANT CHANGE UP (ref 10.3–14.5)
WBC # BLD: 8.62 K/UL — SIGNIFICANT CHANGE UP (ref 3.8–10.5)
WBC # FLD AUTO: 8.62 K/UL — SIGNIFICANT CHANGE UP (ref 3.8–10.5)

## 2019-05-12 PROCEDURE — 99233 SBSQ HOSP IP/OBS HIGH 50: CPT

## 2019-05-12 PROCEDURE — 71250 CT THORAX DX C-: CPT | Mod: 26

## 2019-05-12 RX ORDER — LIDOCAINE 4 G/100G
1 CREAM TOPICAL DAILY
Refills: 0 | Status: DISCONTINUED | OUTPATIENT
Start: 2019-05-12 | End: 2019-05-13

## 2019-05-12 RX ORDER — HYDROCORTISONE 1 %
1 OINTMENT (GRAM) TOPICAL
Qty: 0 | Refills: 0 | DISCHARGE

## 2019-05-12 RX ORDER — FLUTICASONE PROPIONATE 50 MCG
1 SPRAY, SUSPENSION NASAL
Qty: 0 | Refills: 0 | DISCHARGE

## 2019-05-12 RX ORDER — DICLOFENAC SODIUM 30 MG/G
2 GEL TOPICAL THREE TIMES A DAY
Refills: 0 | Status: DISCONTINUED | OUTPATIENT
Start: 2019-05-12 | End: 2019-05-12

## 2019-05-12 RX ORDER — NIACIN 50 MG
1 TABLET ORAL
Qty: 0 | Refills: 0 | DISCHARGE

## 2019-05-12 RX ORDER — INSULIN GLARGINE 100 [IU]/ML
34 INJECTION, SOLUTION SUBCUTANEOUS
Qty: 0 | Refills: 0 | DISCHARGE
Start: 2019-05-12

## 2019-05-12 RX ORDER — ROSUVASTATIN CALCIUM 5 MG/1
1 TABLET ORAL
Qty: 0 | Refills: 0 | DISCHARGE

## 2019-05-12 RX ORDER — LEVOTHYROXINE SODIUM 125 MCG
1 TABLET ORAL
Qty: 0 | Refills: 0 | DISCHARGE

## 2019-05-12 RX ORDER — ATORVASTATIN CALCIUM 80 MG/1
1 TABLET, FILM COATED ORAL
Qty: 30 | Refills: 0
Start: 2019-05-12

## 2019-05-12 RX ORDER — LORATADINE 10 MG/1
1 TABLET ORAL
Qty: 0 | Refills: 0 | DISCHARGE

## 2019-05-12 RX ORDER — ENOXAPARIN SODIUM 100 MG/ML
40 INJECTION SUBCUTANEOUS
Qty: 0 | Refills: 0 | DISCHARGE

## 2019-05-12 RX ORDER — CHROMIUM PICOLINATE 200 MCG
0 TABLET ORAL
Qty: 0 | Refills: 0 | DISCHARGE

## 2019-05-12 RX ADMIN — Medication 10 UNIT(S): at 16:41

## 2019-05-12 RX ADMIN — Medication 10 UNIT(S): at 12:10

## 2019-05-12 RX ADMIN — Medication 3: at 07:51

## 2019-05-12 RX ADMIN — Medication 10 MILLIGRAM(S): at 12:08

## 2019-05-12 RX ADMIN — Medication 75 MICROGRAM(S): at 05:10

## 2019-05-12 RX ADMIN — Medication 650 MILLIGRAM(S): at 22:57

## 2019-05-12 RX ADMIN — Medication 650 MILLIGRAM(S): at 21:51

## 2019-05-12 RX ADMIN — Medication 650 MILLIGRAM(S): at 02:03

## 2019-05-12 RX ADMIN — Medication 650 MILLIGRAM(S): at 09:00

## 2019-05-12 RX ADMIN — Medication 650 MILLIGRAM(S): at 01:33

## 2019-05-12 RX ADMIN — LIDOCAINE 1 PATCH: 4 CREAM TOPICAL at 16:42

## 2019-05-12 RX ADMIN — TICAGRELOR 90 MILLIGRAM(S): 90 TABLET ORAL at 05:10

## 2019-05-12 RX ADMIN — HEPARIN SODIUM 5000 UNIT(S): 5000 INJECTION INTRAVENOUS; SUBCUTANEOUS at 05:10

## 2019-05-12 RX ADMIN — Medication 81 MILLIGRAM(S): at 12:08

## 2019-05-12 RX ADMIN — HEPARIN SODIUM 5000 UNIT(S): 5000 INJECTION INTRAVENOUS; SUBCUTANEOUS at 21:51

## 2019-05-12 RX ADMIN — Medication 650 MILLIGRAM(S): at 07:51

## 2019-05-12 RX ADMIN — Medication 50 MILLIGRAM(S): at 05:10

## 2019-05-12 RX ADMIN — Medication 10 UNIT(S): at 07:51

## 2019-05-12 RX ADMIN — LIDOCAINE 1 PATCH: 4 CREAM TOPICAL at 19:49

## 2019-05-12 RX ADMIN — INSULIN GLARGINE 34 UNIT(S): 100 INJECTION, SOLUTION SUBCUTANEOUS at 21:52

## 2019-05-12 RX ADMIN — ATORVASTATIN CALCIUM 80 MILLIGRAM(S): 80 TABLET, FILM COATED ORAL at 21:51

## 2019-05-12 RX ADMIN — HEPARIN SODIUM 5000 UNIT(S): 5000 INJECTION INTRAVENOUS; SUBCUTANEOUS at 16:45

## 2019-05-12 RX ADMIN — TICAGRELOR 90 MILLIGRAM(S): 90 TABLET ORAL at 16:45

## 2019-05-12 NOTE — PROGRESS NOTE ADULT - PROBLEM SELECTOR PLAN 3
Cont to monitor blood glucose   A1C = 9.0   CW lantus 34u bedtime and Humalog 10 units before meals  AS per ENdocrine team : Outpt. endo follow-up. Appointment scheduled with our diabetes educator on 6/27 at 11am and Dr. Yolande Reid 9/6 at 1pm. If patient cannot make these appointments he should call [phone] to reschedule.

## 2019-05-12 NOTE — DISCHARGE NOTE NURSING/CASE MANAGEMENT/SOCIAL WORK - NSDCDPATPORTLINK_GEN_ALL_CORE
You can access the [facility] Portal, offered by [facility], by registering with the following website: [email or web address]

## 2019-05-12 NOTE — PROGRESS NOTE ADULT - PROBLEM SELECTOR PLAN 1
S/P LHC with ELSY to mLAD ( 70%)   CW ASA, Brilinta, Atorvastatin, Lisinopril, Metoprolol   MOst likely with component of Musculoskeletal/ Costochondritis / f/up Xrays of RIbs and since patient is getting better will dc home with Tylenol and Topical Diclofenac gel and patient is to f/up with PCP post dc within one week who is also his cardiologist
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
## 2019-05-12 NOTE — PROGRESS NOTE ADULT - ATTENDING COMMENTS
F/UP rib xrays and if no FX will dc home today and f/up with PCP / Cardiologist .  Discussed with cardiologist / DC time 40 mns.        Whitney Tesfaye   [facility]   [phone] Addendum  : Rib Xrays with acute minimally displaced Left 8th rib FX / seen by Surgery team who recommend CT of chest / please f/up results       [facility]
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
## 2019-05-12 NOTE — PROGRESS NOTE ADULT - SUBJECTIVE AND OBJECTIVE BOX
Patient is a 54y old  Male who presents with a chief complaint of chest pain (11 May 2019 14:26)      INTERVAL HPI/OVERNIGHT EVENTS:  Tele  : NSR 70-80    Patient is seen today and states that the left chest pain is improved with tylenol but not completely resolved .  NO SOB.  pt states that the pain is worse with exertion.  NO SOB, no palpitation       Medications:MEDICATIONS  (STANDING):  aspirin enteric coated 81 milliGRAM(s) Oral daily  atorvastatin 80 milliGRAM(s) Oral at bedtime  dextrose 5%. 1000 milliLiter(s) (50 mL/Hr) IV Continuous <Continuous>  dextrose 50% Injectable 12.5 Gram(s) IV Push once  dextrose 50% Injectable 25 Gram(s) IV Push once  dextrose 50% Injectable 25 Gram(s) IV Push once  enalapril 10 milliGRAM(s) Oral daily  heparin  Injectable 5000 Unit(s) SubCutaneous every 8 hours  insulin glargine Injectable (LANTUS) 34 Unit(s) SubCutaneous at bedtime  insulin lispro (HumaLOG) corrective regimen sliding scale   SubCutaneous three times a day before meals  insulin lispro (HumaLOG) corrective regimen sliding scale   SubCutaneous at bedtime  insulin lispro Injectable (HumaLOG) 10 Unit(s) SubCutaneous three times a day before meals  levothyroxine 75 MICROGram(s) Oral daily  metoprolol succinate ER 50 milliGRAM(s) Oral daily  ticagrelor 90 milliGRAM(s) Oral two times a day    MEDICATIONS  (PRN):  acetaminophen   Tablet .. 650 milliGRAM(s) Oral every 6 hours PRN Temp greater or equal to 38C (100.4F), Moderate Pain (4 - 6)  dextrose 40% Gel 15 Gram(s) Oral once PRN Blood Glucose LESS THAN 70 milliGRAM(s)/deciliter  glucagon  Injectable 1 milliGRAM(s) IntraMuscular once PRN Glucose LESS THAN 70 milligrams/deciliter      Allergies: Allergies    No Known Allergies          REVIEW OF SYSTEMS:  CONSTITUTIONAL: No fever  NECK: No pain or stiffness  RESPIRATORY: No cough, wheezing, chills or hemoptysis; No shortness of breath  CARDIOVASCULAR: SEE Above,  No palpitations, dizziness, or leg swelling  GASTROINTESTINAL: No abdominal or epigastric pain. No nausea, vomiting, or hematemesis; No diarrhea or constipation. No melena or hematochezia.  GENITOURINARY: No dysuria, frequency, hematuria, or incontinence  NEUROLOGICAL: No headache  LYMPH NODES: No enlarged glands  ENDOCRINE: No heat or cold intolerance; No hair loss    T(C): 36.4 (05-12-19 @ 04:44), Max: 36.7 (05-11-19 @ 11:51)  HR: 71 (05-12-19 @ 04:44) (69 - 79)  BP: 158/84 (05-12-19 @ 04:44) (143/90 - 168/95)  RR: 18 (05-12-19 @ 04:44) (18 - 18)  SpO2: 96% (05-12-19 @ 04:44) (95% - 98%)  Wt(kg): --Vital Signs Last 24 Hrs  T(C): 36.4 (12 May 2019 04:44), Max: 36.7 (11 May 2019 11:51)  T(F): 97.5 (12 May 2019 04:44), Max: 98 (11 May 2019 11:51)  HR: 71 (12 May 2019 04:44) (69 - 79)  BP: 158/84 (12 May 2019 04:44) (143/90 - 168/95)  BP(mean): --  RR: 18 (12 May 2019 04:44) (18 - 18)  SpO2: 96% (12 May 2019 04:44) (95% - 98%)  I&O's Summary    11 May 2019 07:01  -  12 May 2019 07:00  --------------------------------------------------------  IN: 360 mL / OUT: 0 mL / NET: 360 mL    12 May 2019 07:01  -  12 May 2019 10:12  --------------------------------------------------------  IN: 240 mL / OUT: 0 mL / NET: 240 mL            PHYSICAL EXAM:  GENERAL: NAD, well-groomed, well-developed  HEAD:  Atraumatic, Normocephalic  EYES: EOMI, PERRLA, conjunctiva and sclera clear  ENMT: No tonsillar erythema, exudates, or enlargement; Moist mucous membranes, Good dentition, No lesions  NECK: Supple, No JVD, Normal thyroid  NERVOUS SYSTEM:  Alert & Oriented X3, Good concentration  CHEST/LUNG: Clear to percussion bilaterally; No rales, rhonchi, wheezing, or rubs  HEART: Regular rate and rhythm; No murmurs, rubs, or gallops, pos reproducible tenderness on the left costochondral joint  area of 11 0r 12 ribs with less tenderness compared to yesterday and no rash   ABDOMEN: Soft, Nontender, Nondistended; Bowel sounds present  Skin   : RT radial area with no hematoma     Consultant(s) Notes Reviewed:  [x ] YES  [ ] NO  Care Discussed with Consultants/Other Providers [ x] YES  [ ] NO  Name of Consultant  LABS:                        13.0   8.62  )-----------( 292      ( 12 May 2019 09:47 )             39.1     05-11    139  |  101  |  13  ----------------------------<  197<H>  4.2   |  22  |  0.64    Ca    9.6      11 May 2019 06:43          CAPILLARY BLOOD GLUCOSE      POCT Blood Glucose.: 257 mg/dL (12 May 2019 07:48)  POCT Blood Glucose.: 210 mg/dL (11 May 2019 21:07)  POCT Blood Glucose.: 122 mg/dL (11 May 2019 16:20)  POCT Blood Glucose.: 164 mg/dL (11 May 2019 11:40)            RADIOLOGY & ADDITIONAL TESTS:  EKG :     Imaging Personally Reviewed:  [ ] YES  [ ] NO  HEALTH ISSUES - PROBLEM Dx:  Acquired hypothyroidism: Acquired hypothyroidism  Hyperlipidemia, unspecified hyperlipidemia type: Hyperlipidemia, unspecified hyperlipidemia type  Essential hypertension: Essential hypertension  Type 2 diabetes mellitus with hyperglycemia, with long-term current use of insulin: Type 2 diabetes mellitus with hyperglycemia, with long-term current use of insulin  Hypothyroidism: Hypothyroidism  Prophylactic measure: Prophylactic measure  HLD (hyperlipidemia): HLD (hyperlipidemia)  Hypertension: Hypertension  Type 2 diabetes mellitus: Type 2 diabetes mellitus  CAD (coronary artery disease): CAD (coronary artery disease)  Chest pain: Chest pain

## 2019-05-13 VITALS
TEMPERATURE: 97 F | DIASTOLIC BLOOD PRESSURE: 90 MMHG | HEART RATE: 79 BPM | RESPIRATION RATE: 18 BRPM | SYSTOLIC BLOOD PRESSURE: 144 MMHG | OXYGEN SATURATION: 97 %

## 2019-05-13 DIAGNOSIS — S22.39XA FRACTURE OF ONE RIB, UNSPECIFIED SIDE, INITIAL ENCOUNTER FOR CLOSED FRACTURE: ICD-10-CM

## 2019-05-13 LAB
ANION GAP SERPL CALC-SCNC: 14 MMOL/L — SIGNIFICANT CHANGE UP (ref 5–17)
BUN SERPL-MCNC: 18 MG/DL — SIGNIFICANT CHANGE UP (ref 7–23)
CALCIUM SERPL-MCNC: 10 MG/DL — SIGNIFICANT CHANGE UP (ref 8.4–10.5)
CHLORIDE SERPL-SCNC: 101 MMOL/L — SIGNIFICANT CHANGE UP (ref 96–108)
CO2 SERPL-SCNC: 23 MMOL/L — SIGNIFICANT CHANGE UP (ref 22–31)
CREAT SERPL-MCNC: 0.76 MG/DL — SIGNIFICANT CHANGE UP (ref 0.5–1.3)
GLUCOSE BLDC GLUCOMTR-MCNC: 143 MG/DL — HIGH (ref 70–99)
GLUCOSE BLDC GLUCOMTR-MCNC: 186 MG/DL — HIGH (ref 70–99)
GLUCOSE BLDC GLUCOMTR-MCNC: 216 MG/DL — HIGH (ref 70–99)
GLUCOSE SERPL-MCNC: 229 MG/DL — HIGH (ref 70–99)
HCT VFR BLD CALC: 39.8 % — SIGNIFICANT CHANGE UP (ref 39–50)
HGB BLD-MCNC: 13 G/DL — SIGNIFICANT CHANGE UP (ref 13–17)
MCHC RBC-ENTMCNC: 28.5 PG — SIGNIFICANT CHANGE UP (ref 27–34)
MCHC RBC-ENTMCNC: 32.7 GM/DL — SIGNIFICANT CHANGE UP (ref 32–36)
MCV RBC AUTO: 87.3 FL — SIGNIFICANT CHANGE UP (ref 80–100)
PLATELET # BLD AUTO: 273 K/UL — SIGNIFICANT CHANGE UP (ref 150–400)
POTASSIUM SERPL-MCNC: 4.4 MMOL/L — SIGNIFICANT CHANGE UP (ref 3.5–5.3)
POTASSIUM SERPL-SCNC: 4.4 MMOL/L — SIGNIFICANT CHANGE UP (ref 3.5–5.3)
RBC # BLD: 4.56 M/UL — SIGNIFICANT CHANGE UP (ref 4.2–5.8)
RBC # FLD: 12.8 % — SIGNIFICANT CHANGE UP (ref 10.3–14.5)
SODIUM SERPL-SCNC: 138 MMOL/L — SIGNIFICANT CHANGE UP (ref 135–145)
WBC # BLD: 9.39 K/UL — SIGNIFICANT CHANGE UP (ref 3.8–10.5)
WBC # FLD AUTO: 9.39 K/UL — SIGNIFICANT CHANGE UP (ref 3.8–10.5)

## 2019-05-13 PROCEDURE — 82962 GLUCOSE BLOOD TEST: CPT

## 2019-05-13 PROCEDURE — 93005 ELECTROCARDIOGRAM TRACING: CPT

## 2019-05-13 PROCEDURE — 93454 CORONARY ARTERY ANGIO S&I: CPT | Mod: 59

## 2019-05-13 PROCEDURE — 85730 THROMBOPLASTIN TIME PARTIAL: CPT

## 2019-05-13 PROCEDURE — 93572 IV DOP VEL&/PRESS C FLO EA: CPT | Mod: LD

## 2019-05-13 PROCEDURE — 84295 ASSAY OF SERUM SODIUM: CPT

## 2019-05-13 PROCEDURE — 93571 IV DOP VEL&/PRESS C FLO 1ST: CPT | Mod: LM

## 2019-05-13 PROCEDURE — 82550 ASSAY OF CK (CPK): CPT

## 2019-05-13 PROCEDURE — 83735 ASSAY OF MAGNESIUM: CPT

## 2019-05-13 PROCEDURE — 82330 ASSAY OF CALCIUM: CPT

## 2019-05-13 PROCEDURE — 84100 ASSAY OF PHOSPHORUS: CPT

## 2019-05-13 PROCEDURE — 85014 HEMATOCRIT: CPT

## 2019-05-13 PROCEDURE — 99239 HOSP IP/OBS DSCHRG MGMT >30: CPT

## 2019-05-13 PROCEDURE — C1894: CPT

## 2019-05-13 PROCEDURE — 83880 ASSAY OF NATRIURETIC PEPTIDE: CPT

## 2019-05-13 PROCEDURE — 84484 ASSAY OF TROPONIN QUANT: CPT

## 2019-05-13 PROCEDURE — 82803 BLOOD GASES ANY COMBINATION: CPT

## 2019-05-13 PROCEDURE — 85027 COMPLETE CBC AUTOMATED: CPT

## 2019-05-13 PROCEDURE — 71110 X-RAY EXAM RIBS BIL 3 VIEWS: CPT

## 2019-05-13 PROCEDURE — 71250 CT THORAX DX C-: CPT

## 2019-05-13 PROCEDURE — 99153 MOD SED SAME PHYS/QHP EA: CPT

## 2019-05-13 PROCEDURE — 83036 HEMOGLOBIN GLYCOSYLATED A1C: CPT

## 2019-05-13 PROCEDURE — 80048 BASIC METABOLIC PNL TOTAL CA: CPT

## 2019-05-13 PROCEDURE — C1887: CPT

## 2019-05-13 PROCEDURE — 82947 ASSAY GLUCOSE BLOOD QUANT: CPT

## 2019-05-13 PROCEDURE — 71046 X-RAY EXAM CHEST 2 VIEWS: CPT

## 2019-05-13 PROCEDURE — 99285 EMERGENCY DEPT VISIT HI MDM: CPT

## 2019-05-13 PROCEDURE — 82553 CREATINE MB FRACTION: CPT

## 2019-05-13 PROCEDURE — 83605 ASSAY OF LACTIC ACID: CPT

## 2019-05-13 PROCEDURE — C1769: CPT

## 2019-05-13 PROCEDURE — 93306 TTE W/DOPPLER COMPLETE: CPT

## 2019-05-13 PROCEDURE — 99232 SBSQ HOSP IP/OBS MODERATE 35: CPT

## 2019-05-13 PROCEDURE — C1874: CPT

## 2019-05-13 PROCEDURE — C1725: CPT

## 2019-05-13 PROCEDURE — 82435 ASSAY OF BLOOD CHLORIDE: CPT

## 2019-05-13 PROCEDURE — 99152 MOD SED SAME PHYS/QHP 5/>YRS: CPT

## 2019-05-13 PROCEDURE — 85610 PROTHROMBIN TIME: CPT

## 2019-05-13 PROCEDURE — 80053 COMPREHEN METABOLIC PANEL: CPT

## 2019-05-13 PROCEDURE — C9600: CPT | Mod: LD

## 2019-05-13 PROCEDURE — 84132 ASSAY OF SERUM POTASSIUM: CPT

## 2019-05-13 RX ORDER — ENOXAPARIN SODIUM 100 MG/ML
40 INJECTION SUBCUTANEOUS
Qty: 0 | Refills: 0 | DISCHARGE

## 2019-05-13 RX ORDER — LEVOTHYROXINE SODIUM 125 MCG
1 TABLET ORAL
Qty: 30 | Refills: 0
Start: 2019-05-13 | End: 2019-06-11

## 2019-05-13 RX ORDER — ATORVASTATIN CALCIUM 80 MG/1
1 TABLET, FILM COATED ORAL
Qty: 30 | Refills: 0
Start: 2019-05-13 | End: 2019-06-11

## 2019-05-13 RX ORDER — INSULIN GLARGINE 100 [IU]/ML
36 INJECTION, SOLUTION SUBCUTANEOUS AT BEDTIME
Refills: 0 | Status: DISCONTINUED | OUTPATIENT
Start: 2019-05-13 | End: 2019-05-13

## 2019-05-13 RX ADMIN — HEPARIN SODIUM 5000 UNIT(S): 5000 INJECTION INTRAVENOUS; SUBCUTANEOUS at 14:09

## 2019-05-13 RX ADMIN — LIDOCAINE 1 PATCH: 4 CREAM TOPICAL at 12:06

## 2019-05-13 RX ADMIN — Medication 10 UNIT(S): at 08:03

## 2019-05-13 RX ADMIN — Medication 75 MICROGRAM(S): at 05:05

## 2019-05-13 RX ADMIN — HEPARIN SODIUM 5000 UNIT(S): 5000 INJECTION INTRAVENOUS; SUBCUTANEOUS at 05:05

## 2019-05-13 RX ADMIN — Medication 650 MILLIGRAM(S): at 06:00

## 2019-05-13 RX ADMIN — TICAGRELOR 90 MILLIGRAM(S): 90 TABLET ORAL at 16:49

## 2019-05-13 RX ADMIN — Medication 650 MILLIGRAM(S): at 05:11

## 2019-05-13 RX ADMIN — Medication 10 UNIT(S): at 16:49

## 2019-05-13 RX ADMIN — Medication 50 MILLIGRAM(S): at 05:05

## 2019-05-13 RX ADMIN — TICAGRELOR 90 MILLIGRAM(S): 90 TABLET ORAL at 05:05

## 2019-05-13 RX ADMIN — Medication 1: at 08:03

## 2019-05-13 RX ADMIN — Medication 650 MILLIGRAM(S): at 12:07

## 2019-05-13 RX ADMIN — LIDOCAINE 1 PATCH: 4 CREAM TOPICAL at 05:07

## 2019-05-13 RX ADMIN — Medication 10 MILLIGRAM(S): at 05:05

## 2019-05-13 RX ADMIN — Medication 10 UNIT(S): at 12:07

## 2019-05-13 RX ADMIN — Medication 2: at 12:07

## 2019-05-13 RX ADMIN — Medication 81 MILLIGRAM(S): at 12:07

## 2019-05-13 RX ADMIN — Medication 650 MILLIGRAM(S): at 12:37

## 2019-05-13 NOTE — PROGRESS NOTE ADULT - PROBLEM SELECTOR PLAN 1
-test BG AC/HS  Discharge plan:   Restart Lantus 40 units QHS  -Pt to restart Humalog 10-15 units w/meals at home  -Pt given rx for Freestyle Po  Outpt. endo follow-up. Appointment scheduled with our diabetes educator on 6/27 at 11am and Dr. Yolande Reid 9/6 at 1pm. If patient cannot make these appointments he should call [phone] to reschedule.   Outpt. optho, podiatry, nephrology  discussed w/pt and team  pager: [phone]

## 2019-05-13 NOTE — PROGRESS NOTE ADULT - PROBLEM SELECTOR PLAN 1
CAD S/P LHC with ELSY to mLAD ( 70%) this admission. No chest pain at rest or exertion  C/W ASA, Brilinta, Atorvastatin, Lisinopril, Metoprolol  outpatient followup with Dr Melgar

## 2019-05-13 NOTE — PROGRESS NOTE ADULT - SUBJECTIVE AND OBJECTIVE BOX
Diabetes Follow up note:  Interval Hx:  54 year old male w/uncontrolled T2DM w/neuropathy, CAD w/stents here w/chest pain now s/p PCI. BG values variable from 100-low 200s. Tolerating POs. To be discharged home. Requesting Freestyle Po RX.     Review of Systems:   General: denies pain  GI: Tolerating POs without any N/V/D/ABD PAIN.  CV: No CP/SOB  ENDO: No S&Sx of hypoglycemia  MEDS:  atorvastatin 80 milliGRAM(s) Oral at bedtime    insulin glargine Injectable (LANTUS) 36 Unit(s) SubCutaneous at bedtime  insulin lispro (HumaLOG) corrective regimen sliding scale   SubCutaneous three times a day before meals  insulin lispro (HumaLOG) corrective regimen sliding scale   SubCutaneous at bedtime  insulin lispro Injectable (HumaLOG) 10 Unit(s) SubCutaneous three times a day before meals  levothyroxine 75 MICROGram(s) Oral daily      Allergies    No Known Allergies        PE:  General: Male lying in bed. NAD.   Vital Signs Last 24 Hrs  T(C): 36.3 (13 May 2019 12:17), Max: 36.7 (13 May 2019 05:02)  T(F): 97.4 (13 May 2019 12:17), Max: 98 (13 May 2019 05:02)  HR: 79 (13 May 2019 12:17) (77 - 79)  BP: 144/90 (13 May 2019 12:17) (123/80 - 157/88)  BP(mean): --  RR: 18 (13 May 2019 12:17) (18 - 18)  SpO2: 97% (13 May 2019 12:17) (96% - 97%)  Abd: Soft, NT,ND,   Extremities: Warm. No edema x 4 ext.   Neuro: A&O X3    LABS:    POCT Blood Glucose.: 216 mg/dL (05-13-19 @ 11:36)  POCT Blood Glucose.: 186 mg/dL (05-13-19 @ 07:54)  POCT Blood Glucose.: 142 mg/dL (05-12-19 @ 21:04)  POCT Blood Glucose.: 119 mg/dL (05-12-19 @ 16:36)  POCT Blood Glucose.: 124 mg/dL (05-12-19 @ 12:07)  POCT Blood Glucose.: 257 mg/dL (05-12-19 @ 07:48)  POCT Blood Glucose.: 210 mg/dL (05-11-19 @ 21:07)  POCT Blood Glucose.: 122 mg/dL (05-11-19 @ 16:20)  POCT Blood Glucose.: 164 mg/dL (05-11-19 @ 11:40)  POCT Blood Glucose.: 199 mg/dL (05-11-19 @ 07:43)  POCT Blood Glucose.: 169 mg/dL (05-10-19 @ 21:04)  POCT Blood Glucose.: 361 mg/dL (05-10-19 @ 16:29)                            13.0   9.39  )-----------( 273      ( 13 May 2019 09:10 )             39.8       05-13    138  |  101  |  18  ----------------------------<  229<H>  4.4   |  23  |  0.76    Ca    10.0      13 May 2019 13:48         Hemoglobin A1C, Whole Blood: 9.0 % <H> [4.0 - 5.6] (05-10-19 @ 01:03)  Hemoglobin A1C, Whole Blood: 8.9 % <H> [4.0 - 5.6] (04-25-19 @ 03:04)            Contact number: js [phone] or [phone]

## 2019-05-13 NOTE — PROGRESS NOTE ADULT - SUBJECTIVE AND OBJECTIVE BOX
Patient is a 54y old  Male who presents with a chief complaint of chest pain (12 May 2019 16:03)      SUBJECTIVE / OVERNIGHT EVENTS: No overnight events. feels well, denies chest pain, sob, palpitations, bleeding. Has some left sided lower rib pain only if he presses on it.     MEDICATIONS  (STANDING):  aspirin enteric coated 81 milliGRAM(s) Oral daily  atorvastatin 80 milliGRAM(s) Oral at bedtime  dextrose 5%. 1000 milliLiter(s) (50 mL/Hr) IV Continuous <Continuous>  dextrose 50% Injectable 12.5 Gram(s) IV Push once  dextrose 50% Injectable 25 Gram(s) IV Push once  dextrose 50% Injectable 25 Gram(s) IV Push once  enalapril 10 milliGRAM(s) Oral daily  heparin  Injectable 5000 Unit(s) SubCutaneous every 8 hours  insulin glargine Injectable (LANTUS) 34 Unit(s) SubCutaneous at bedtime  insulin lispro (HumaLOG) corrective regimen sliding scale   SubCutaneous three times a day before meals  insulin lispro (HumaLOG) corrective regimen sliding scale   SubCutaneous at bedtime  insulin lispro Injectable (HumaLOG) 10 Unit(s) SubCutaneous three times a day before meals  levothyroxine 75 MICROGram(s) Oral daily  lidocaine   Patch 1 Patch Transdermal daily  metoprolol succinate ER 50 milliGRAM(s) Oral daily  ticagrelor 90 milliGRAM(s) Oral two times a day    MEDICATIONS  (PRN):  acetaminophen   Tablet .. 650 milliGRAM(s) Oral every 6 hours PRN Temp greater or equal to 38C (100.4F), Moderate Pain (4 - 6)  dextrose 40% Gel 15 Gram(s) Oral once PRN Blood Glucose LESS THAN 70 milliGRAM(s)/deciliter  glucagon  Injectable 1 milliGRAM(s) IntraMuscular once PRN Glucose LESS THAN 70 milligrams/deciliter      Vital Signs Last 24 Hrs  T(C): 36.3 (13 May 2019 12:17), Max: 36.7 (13 May 2019 05:02)  T(F): 97.4 (13 May 2019 12:17), Max: 98 (13 May 2019 05:02)  HR: 79 (13 May 2019 12:17) (77 - 79)  BP: 144/90 (13 May 2019 12:17) (123/80 - 157/88)  BP(mean): --  RR: 18 (13 May 2019 12:17) (18 - 18)  SpO2: 97% (13 May 2019 12:17) (96% - 97%)  CAPILLARY BLOOD GLUCOSE      POCT Blood Glucose.: 216 mg/dL (13 May 2019 11:36)  POCT Blood Glucose.: 186 mg/dL (13 May 2019 07:54)  POCT Blood Glucose.: 142 mg/dL (12 May 2019 21:04)  POCT Blood Glucose.: 119 mg/dL (12 May 2019 16:36)    I&O's Summary    12 May 2019 07:01  -  13 May 2019 07:00  --------------------------------------------------------  IN: 800 mL / OUT: 0 mL / NET: 800 mL    13 May 2019 07:01  -  13 May 2019 12:31  --------------------------------------------------------  IN: 240 mL / OUT: 0 mL / NET: 240 mL        PHYSICAL EXAM:  GENERAL: NAD, well-developed  HEAD:  Atraumatic, Normocephalic  NECK: Supple, No JVD  CHEST/LUNG: Clear to auscultation bilaterally; No wheeze. left sided lower rib tendernses  HEART: Regular rate and rhythm; No murmurs, rubs, or gallops  ABDOMEN: Soft, Nontender, Nondistended; Bowel sounds present  EXTREMITIES:  2+ Peripheral Pulses, No clubbing, cyanosis, or edema  PSYCH: AAOx3  NEUROLOGY: non-focal      LABS:                        13.0   9.39  )-----------( 273      ( 13 May 2019 09:10 )             39.8           RADIOLOGY & ADDITIONAL TESTS:    tele reviewed: sinus 70-90    Imaging Personally Reviewed: CT chest: FINDINGS:     Tubes/Lines: None.    Lungs And Airways: The lungs are clear. The airways are unremarkable.      Pleura: No pleural effusion. No pneumothorax.    Mediastinum: Small AP window and paratracheal lymph nodes are present.   The visualized portion of the thyroid gland is unremarkable.       Heart and Vasculature: The heart is normal in size.  There is no   pericardial effusion. Coronary artery calcification versus stents.   Atheromatous disease of the aorta.    Upper Abdomen: The upper abdomen is unremarkable.    Bones And Soft Tissues: Age indeterminate minimally displaced left 8th   rib fracture. Question lucency within the left 7th rib suggestive of a   nondisplaced fracture. The soft tissues are unremarkable.      IMPRESSION:     1.  Age indeterminate minimally displaced left 8th rib fracture. Question   lucency within the left 7th rib suggestive of a nondisplaced fracture.  2.  No pneumothorax.      TTE" EF (Visual Estimate): 60-65 %  ------------------------------------------------------------------------  Observations:  Mitral Valve: Normal mitral valve.  Aortic Valve/Aorta: Normal aortic valve.  Normal aortic root size. (Ao: 3.1 cm at the sinuses of  Valsalva).  Left Atrium: Normal left atrium.  LA volume index = 24  cc/m2.  Left Ventricle: Normal left ventricular systolic function.  Basal inferioseptal and basal inferior wall akinesis.  Normal left ventricular internal dimensions and wall  thicknesses. Impaired LV-relaxation with normal filling  pressure.  Right Heart: Normal right atrium. Normal right ventricular  size and function. Normal tricuspid valve. Minimal  tricuspid regurgitation. Normal pulmonic valve.  Pericardium/Pleura: Normal pericardium with no pericardial  effusion.  Hemodynamic: Estimated RA pressure is normal.  Pulmonary artery pressure is normal.  ------------------------------------------------------------------------  Conclusions:  Normal left ventricular systolic function. Basal  inferioseptal and basal inferior wall akinesis.    Consultant(s) Notes Reviewed:  cards, surgery    Care Discussed with Consultants/Other Providers:

## 2019-05-13 NOTE — PROGRESS NOTE ADULT - PROBLEM SELECTOR PLAN 3
likely from baseball hit a few months ago. CT confirm 7th and 8th left rib fractures  -seen by surgery - no intervention, breathing well on RA  -c/w tylenol prn, avoid NSAIDS as on DAPT likely from baseball hit a few months ago. CT confirm 7th and 8th left rib fractures  -discussed with surgery- no intervention based on Ct. breathing well on RA  -c/w tylenol prn, avoid NSAIDS as on DAPT

## 2019-05-13 NOTE — PROGRESS NOTE ADULT - PROBLEM SELECTOR PLAN 4
c/w metoprolol  Monitor BP and HR  BP is not at goal / Increase Enalapril to 10 mg po daily
FS stable, a1C = 9.0   CW lantus 34u bedtime and Humalog 10 -15u nits before meals, (takes lantus 40 units at home)  AS per ENdocrine team : Outpt. endo follow-up. Appointment scheduled with our diabetes educator on 6/27 at 11am and Dr. Yolande Reid 9/6 at 1pm.
c/w enalapril and metoprolol  Monitor BP and HR
c/w enalapril and metoprolol  Monitor BP and HR

## 2019-05-13 NOTE — PROGRESS NOTE ADULT - PROBLEM SELECTOR PLAN 8
Heparin subcutaneous  Dispo: discharge home today with close outpatient cards followup with Dr Melgar (gave signout to him) and endocrine as above  spent 45 min on discharge process time

## 2019-05-13 NOTE — PROGRESS NOTE ADULT - PROBLEM SELECTOR PROBLEM 1
Chest pain
Type 2 diabetes mellitus with hyperglycemia, with long-term current use of insulin
Chest pain

## 2019-05-13 NOTE — PROGRESS NOTE ADULT - ASSESSMENT
53 y/o man with CAD s/p RCA PCI, no s/p PCI to LAD, DM, HTN, HLD with chest pain.  --S/p PCI to LAD but with atypical chest discomfort that is reproducible on exam.  --CP unlikely cardiac in nature--CK is 70, hs trop only 37.    --Likely musculoskeletal symptoms--Medicine follow-up.  --Check TTE as well to assess for effusion though unlikely.  --Continue DAPT.  --Discharge planning if patient feels comfortable with close outpatient follow-up.
53 yo male with PMH of HTN, HLD, DM on insulin, hypothyroidism, CAD s/p ELSY x 2 to mRCA on 4/24/19, now on aspirin/brilinta, sent here by cardiology for chest pain s/p stents and left sided rib fractures
53 yo male with PMH of HTN, HLD, DM on insulin, hypothyroidism, CAD s/p ELSY x 2 to mRCA on 4/24/19, now on aspirin/brilinta, sent here by cardiology for chest pain.
54 M DM, HTN, HLD with recent PCI now with chest pain.  ·	S/p LAD PCI. Currently without symptoms.   ·	Continue DAPT  ·	Continue other medical therapy.   ·	Discharge planning for AM.     =======================================================================  Jonah Whitlock MD FAC    Please page [phone] with questions  On nights and weekend please call the office [phone].  For all Cardiology service contact information, go to [email or web address] and use "[email or web address]" to login.
55 yo male with PMH of HTN, HLD, DM on insulin, hypothyroidism, CAD s/p ELSY x 2 to mRCA on 4/24/19, now on aspirin/brilinta, sent here by cardiology for chest pain.
53 y/o M w/ uncontrolled Type 2 DM w/ neuropathy, nephropathy and CAD w/ hyperglycemia A1c 9%, HTN, HLD, Hypothyroidism admitted with chest pain s/p PCI. BG values variable on present insulin regimen. Pt to be discharged home on basal/bolus. Discussed outpt follow up appointments to evaluate current regimen and adjust. Gave pt RX for Freestyle Po so he can closely monitor glucose levels at home. BG goal (100-180mg/dl).
53 yo male with PMH of HTN, HLD, DM on insulin, hypothyroidism, CAD s/p ELSY x 2 to mRCA on 4/24/19, now on aspirin/brilinta, sent here by cardiology for chest pain.

## 2019-05-13 NOTE — PROGRESS NOTE ADULT - PROBLEM SELECTOR PLAN 5
c/w synthroid
BP improving  c/w Enalapril 10 mg po daily  c/w metoprolol 50mg ER daily
c/w synthroid
c/w synthroid

## 2019-05-13 NOTE — PROGRESS NOTE ADULT - SUBJECTIVE AND OBJECTIVE BOX
Diabetes Follow up note:  Interval Hx:    glycemic control     Review of Systems:  General:  GI: Tolerating POs without any N/V/D/ABD PAIN.  CV: No CP/SOB  ENDO: No S&Sx of hypoglycemia  MEDS:  atorvastatin 80 milliGRAM(s) Oral at bedtime  dextrose 40% Gel 15 Gram(s) Oral once PRN  dextrose 50% Injectable 12.5 Gram(s) IV Push once  dextrose 50% Injectable 25 Gram(s) IV Push once  dextrose 50% Injectable 25 Gram(s) IV Push once  glucagon  Injectable 1 milliGRAM(s) IntraMuscular once PRN  insulin glargine Injectable (LANTUS) 36 Unit(s) SubCutaneous at bedtime  insulin lispro (HumaLOG) corrective regimen sliding scale   SubCutaneous three times a day before meals  insulin lispro (HumaLOG) corrective regimen sliding scale   SubCutaneous at bedtime  insulin lispro Injectable (HumaLOG) 10 Unit(s) SubCutaneous three times a day before meals  levothyroxine 75 MICROGram(s) Oral daily      Allergies    No Known Allergies    Intolerances      PE:  General:  Vital Signs Last 24 Hrs  T(C): 36.3 (13 May 2019 12:17), Max: 36.7 (13 May 2019 05:02)  T(F): 97.4 (13 May 2019 12:17), Max: 98 (13 May 2019 05:02)  HR: 79 (13 May 2019 12:17) (77 - 79)  BP: 144/90 (13 May 2019 12:17) (123/80 - 157/88)  BP(mean): --  RR: 18 (13 May 2019 12:17) (18 - 18)  SpO2: 97% (13 May 2019 12:17) (96% - 97%)  Abd: Soft, NT,ND,   Extremities: Warm  Neuro: A&O X3    LABS:    POCT Blood Glucose.: 216 mg/dL (05-13-19 @ 11:36)  POCT Blood Glucose.: 186 mg/dL (05-13-19 @ 07:54)  POCT Blood Glucose.: 142 mg/dL (05-12-19 @ 21:04)  POCT Blood Glucose.: 119 mg/dL (05-12-19 @ 16:36)  POCT Blood Glucose.: 124 mg/dL (05-12-19 @ 12:07)  POCT Blood Glucose.: 257 mg/dL (05-12-19 @ 07:48)  POCT Blood Glucose.: 210 mg/dL (05-11-19 @ 21:07)  POCT Blood Glucose.: 122 mg/dL (05-11-19 @ 16:20)  POCT Blood Glucose.: 164 mg/dL (05-11-19 @ 11:40)  POCT Blood Glucose.: 199 mg/dL (05-11-19 @ 07:43)  POCT Blood Glucose.: 169 mg/dL (05-10-19 @ 21:04)  POCT Blood Glucose.: 361 mg/dL (05-10-19 @ 16:29)                            13.0   9.39  )-----------( 273      ( 13 May 2019 09:10 )             39.8       05-13    138  |  101  |  18  ----------------------------<  229<H>  4.4   |  23  |  0.76    Ca    10.0      13 May 2019 13:48        Thyroid Function Tests:      Hemoglobin A1C, Whole Blood: 9.0 % <H> [4.0 - 5.6] (05-10-19 @ 01:03)  Hemoglobin A1C, Whole Blood: 8.9 % <H> [4.0 - 5.6] (04-25-19 @ 03:04)            Contact number: js [phone] or [phone]

## 2019-05-14 PROBLEM — I25.10 ATHEROSCLEROTIC HEART DISEASE OF NATIVE CORONARY ARTERY WITHOUT ANGINA PECTORIS: Chronic | Status: ACTIVE | Noted: 2019-05-09

## 2019-05-14 PROBLEM — E03.9 HYPOTHYROIDISM, UNSPECIFIED: Chronic | Status: ACTIVE | Noted: 2019-05-09

## 2019-05-19 NOTE — HISTORY OF PRESENT ILLNESS
[FreeTextEntry1] : pt presents for f/u pt s/p ptci rca .pt with recurrent c.p .pt s/p nuclear stress test tid and inferior ischemia post procedure pt denies any chest  pain dizziness ,lightheadedness ,nausea vomiting diaphoresis pt with chest discomfort with exertion \par

## 2019-05-19 NOTE — PHYSICAL EXAM
[General Appearance - Well Developed] : well developed [Well Groomed] : well groomed [Normal Appearance] : normal appearance [General Appearance - Well Nourished] : well nourished [General Appearance - In No Acute Distress] : no acute distress [No Deformities] : no deformities [Normal Conjunctiva] : the conjunctiva exhibited no abnormalities [Eyelids - No Xanthelasma] : the eyelids demonstrated no xanthelasmas [No Oral Pallor] : no oral pallor [No Oral Cyanosis] : no oral cyanosis [Normal Oral Mucosa] : normal oral mucosa [Normal Jugular Venous V Waves Present] : normal jugular venous V waves present [Normal Jugular Venous A Waves Present] : normal jugular venous A waves present [No Jugular Venous Clemente A Waves] : no jugular venous clemente A waves [Exaggerated Use Of Accessory Muscles For Inspiration] : no accessory muscle use [Respiration, Rhythm And Depth] : normal respiratory rhythm and effort [Auscultation Breath Sounds / Voice Sounds] : lungs were clear to auscultation bilaterally [Heart Sounds] : normal S1 and S2 [Heart Rate And Rhythm] : heart rate and rhythm were normal [Abdomen Soft] : soft [Murmurs] : no murmurs present [Abdomen Tenderness] : non-tender [Abdomen Mass (___ Cm)] : no abdominal mass palpated [Nail Clubbing] : no clubbing of the fingernails [Abnormal Walk] : normal gait [Gait - Sufficient For Exercise Testing] : the gait was sufficient for exercise testing [Cyanosis, Localized] : no localized cyanosis [Skin Color & Pigmentation] : normal skin color and pigmentation [Petechial Hemorrhages (___cm)] : no petechial hemorrhages [] : no rash [No Venous Stasis] : no venous stasis [Skin Lesions] : no skin lesions [Oriented To Time, Place, And Person] : oriented to person, place, and time [No Skin Ulcers] : no skin ulcer [No Xanthoma] : no  xanthoma was observed [No Anxiety] : not feeling anxious [Mood] : the mood was normal [Affect] : the affect was normal [FreeTextEntry1] : site of radial cath intact

## 2019-05-22 ENCOUNTER — RESULT CHARGE (OUTPATIENT)
Age: 54
End: 2019-05-22

## 2019-05-23 ENCOUNTER — APPOINTMENT (OUTPATIENT)
Dept: CARDIOLOGY | Facility: CLINIC | Age: 54
End: 2019-05-23
Payer: COMMERCIAL

## 2019-05-23 ENCOUNTER — NON-APPOINTMENT (OUTPATIENT)
Age: 54
End: 2019-05-23

## 2019-05-23 VITALS
HEIGHT: 70 IN | WEIGHT: 182 LBS | TEMPERATURE: 98.1 F | DIASTOLIC BLOOD PRESSURE: 74 MMHG | SYSTOLIC BLOOD PRESSURE: 120 MMHG | OXYGEN SATURATION: 98 % | HEART RATE: 81 BPM | BODY MASS INDEX: 26.05 KG/M2

## 2019-05-23 PROCEDURE — 93000 ELECTROCARDIOGRAM COMPLETE: CPT | Mod: 59

## 2019-05-23 PROCEDURE — 93224 XTRNL ECG REC UP TO 48 HRS: CPT

## 2019-05-23 PROCEDURE — 99214 OFFICE O/P EST MOD 30 MIN: CPT

## 2019-05-23 RX ORDER — METOPROLOL SUCCINATE 50 MG/1
50 TABLET, EXTENDED RELEASE ORAL
Qty: 30 | Refills: 0 | Status: DISCONTINUED | COMMUNITY
Start: 2019-04-22 | End: 2019-05-23

## 2019-05-23 RX ORDER — TICAGRELOR 90 MG/1
90 TABLET ORAL TWICE DAILY
Refills: 0 | Status: ACTIVE | COMMUNITY
Start: 2019-04-25

## 2019-05-23 RX ORDER — PNV NO.95/FERROUS FUM/FOLIC AC 28MG-0.8MG
325 TABLET ORAL
Refills: 0 | Status: DISCONTINUED | COMMUNITY
End: 2019-05-23

## 2019-05-23 NOTE — PHYSICAL EXAM
[General Appearance - Well Developed] : well developed [Normal Appearance] : normal appearance [Well Groomed] : well groomed [General Appearance - Well Nourished] : well nourished [No Deformities] : no deformities [General Appearance - In No Acute Distress] : no acute distress [Normal Conjunctiva] : the conjunctiva exhibited no abnormalities [Eyelids - No Xanthelasma] : the eyelids demonstrated no xanthelasmas [Normal Oral Mucosa] : normal oral mucosa [No Oral Pallor] : no oral pallor [No Oral Cyanosis] : no oral cyanosis [Normal Jugular Venous A Waves Present] : normal jugular venous A waves present [Normal Jugular Venous V Waves Present] : normal jugular venous V waves present [No Jugular Venous Clemente A Waves] : no jugular venous clemente A waves [Respiration, Rhythm And Depth] : normal respiratory rhythm and effort [Exaggerated Use Of Accessory Muscles For Inspiration] : no accessory muscle use [Auscultation Breath Sounds / Voice Sounds] : lungs were clear to auscultation bilaterally [Heart Rate And Rhythm] : heart rate and rhythm were normal [Heart Sounds] : normal S1 and S2 [Murmurs] : no murmurs present [Abdomen Soft] : soft [Abdomen Tenderness] : non-tender [Abdomen Mass (___ Cm)] : no abdominal mass palpated [Abnormal Walk] : normal gait [Gait - Sufficient For Exercise Testing] : the gait was sufficient for exercise testing [Nail Clubbing] : no clubbing of the fingernails [Cyanosis, Localized] : no localized cyanosis [Petechial Hemorrhages (___cm)] : no petechial hemorrhages [Skin Color & Pigmentation] : normal skin color and pigmentation [] : no rash [No Venous Stasis] : no venous stasis [Skin Lesions] : no skin lesions [No Skin Ulcers] : no skin ulcer [No Xanthoma] : no  xanthoma was observed [Oriented To Time, Place, And Person] : oriented to person, place, and time [Affect] : the affect was normal [Mood] : the mood was normal [No Anxiety] : not feeling anxious

## 2019-05-27 LAB
BASOPHILS # BLD AUTO: 0.11 K/UL
BASOPHILS NFR BLD AUTO: 1.5 %
EOSINOPHIL # BLD AUTO: 0.44 K/UL
EOSINOPHIL NFR BLD AUTO: 5.8 %
HCT VFR BLD CALC: 38.7 %
HGB BLD-MCNC: 12.7 G/DL
IMM GRANULOCYTES NFR BLD AUTO: 0.3 %
LYMPHOCYTES # BLD AUTO: 1.68 K/UL
LYMPHOCYTES NFR BLD AUTO: 22.2 %
MAN DIFF?: NORMAL
MCHC RBC-ENTMCNC: 28.9 PG
MCHC RBC-ENTMCNC: 32.8 GM/DL
MCV RBC AUTO: 88 FL
MONOCYTES # BLD AUTO: 0.68 K/UL
MONOCYTES NFR BLD AUTO: 9 %
NEUTROPHILS # BLD AUTO: 4.64 K/UL
NEUTROPHILS NFR BLD AUTO: 61.2 %
PLATELET # BLD AUTO: 280 K/UL
RBC # BLD: 4.4 M/UL
RBC # FLD: 12.6 %
WBC # FLD AUTO: 7.57 K/UL

## 2019-05-28 ENCOUNTER — OTHER (OUTPATIENT)
Age: 54
End: 2019-05-28

## 2019-06-06 ENCOUNTER — NON-APPOINTMENT (OUTPATIENT)
Age: 54
End: 2019-06-06
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
## 2019-06-18 ENCOUNTER — MEDICATION RENEWAL (OUTPATIENT)
Age: 54
End: 2019-06-18

## 2019-06-27 ENCOUNTER — APPOINTMENT (OUTPATIENT)
Dept: ENDOCRINOLOGY | Facility: CLINIC | Age: 54
End: 2019-06-27

## 2019-07-17 ENCOUNTER — APPOINTMENT (OUTPATIENT)
Dept: CARDIOLOGY | Facility: CLINIC | Age: 54
End: 2019-07-17
Payer: COMMERCIAL

## 2019-07-17 VITALS
WEIGHT: 192 LBS | OXYGEN SATURATION: 98 % | TEMPERATURE: 98.4 F | HEIGHT: 70 IN | SYSTOLIC BLOOD PRESSURE: 112 MMHG | DIASTOLIC BLOOD PRESSURE: 78 MMHG | HEART RATE: 80 BPM | BODY MASS INDEX: 27.49 KG/M2

## 2019-07-17 DIAGNOSIS — S22.39XA FRACTURE OF ONE RIB, UNSPECIFIED SIDE, INITIAL ENCOUNTER FOR CLOSED FRACTURE: ICD-10-CM

## 2019-07-17 DIAGNOSIS — R42 DIZZINESS AND GIDDINESS: ICD-10-CM

## 2019-07-17 PROCEDURE — 99214 OFFICE O/P EST MOD 30 MIN: CPT

## 2019-07-17 NOTE — HISTORY OF PRESENT ILLNESS
[de-identified] : This is a 54 year old gentlemen with a PMH of CAD, HLD, HTN, DM, and VitamiN D deficiency presents today for follow up. Patient states that the dizziness and chest pain have resolved, however, patient states that he is still having pain with regard to the two rib fractures that he received. Patient was told that it needs time to heal, he states that the pain is manageable and is relieved with Tylenol. Patient denies dyspnea, palpitations, chest pain, nausea, vomiting, dizziness and lightheadedness.\par

## 2019-07-18 ENCOUNTER — APPOINTMENT (OUTPATIENT)
Dept: ENDOCRINOLOGY | Facility: CLINIC | Age: 54
End: 2019-07-18

## 2019-07-21 LAB
25(OH)D3 SERPL-MCNC: 45.2 NG/ML
ALBUMIN SERPL ELPH-MCNC: 4.9 G/DL
ALP BLD-CCNC: 85 U/L
ALT SERPL-CCNC: 25 U/L
ANION GAP SERPL CALC-SCNC: 14 MMOL/L
AST SERPL-CCNC: 19 U/L
BASOPHILS # BLD AUTO: 0.1 K/UL
BASOPHILS NFR BLD AUTO: 1.2 %
BILIRUB DIRECT SERPL-MCNC: 0.1 MG/DL
BILIRUB INDIRECT SERPL-MCNC: 0.1 MG/DL
BILIRUB SERPL-MCNC: 0.2 MG/DL
BUN SERPL-MCNC: 22 MG/DL
CALCIUM SERPL-MCNC: 9.8 MG/DL
CHLORIDE SERPL-SCNC: 103 MMOL/L
CK SERPL-CCNC: 160 U/L
CO2 SERPL-SCNC: 24 MMOL/L
CREAT SERPL-MCNC: 0.75 MG/DL
CREAT SPEC-SCNC: 115 MG/DL
EOSINOPHIL # BLD AUTO: 0.43 K/UL
EOSINOPHIL NFR BLD AUTO: 5 %
ESTIMATED AVERAGE GLUCOSE: 212 MG/DL
FERRITIN SERPL-MCNC: 244 NG/ML
FOLATE SERPL-MCNC: 11.7 NG/ML
GLUCOSE SERPL-MCNC: 214 MG/DL
HBA1C MFR BLD HPLC: 9 %
HCT VFR BLD CALC: 39.2 %
HGB BLD-MCNC: 12.6 G/DL
IMM GRANULOCYTES NFR BLD AUTO: 0.4 %
IRON SERPL-MCNC: 66 UG/DL
LYMPHOCYTES # BLD AUTO: 1.89 K/UL
LYMPHOCYTES NFR BLD AUTO: 22.2 %
MAN DIFF?: NORMAL
MCHC RBC-ENTMCNC: 29.1 PG
MCHC RBC-ENTMCNC: 32.1 GM/DL
MCV RBC AUTO: 90.5 FL
MICROALBUMIN 24H UR DL<=1MG/L-MCNC: <1.2 MG/DL
MICROALBUMIN/CREAT 24H UR-RTO: NORMAL MG/G
MONOCYTES # BLD AUTO: 0.72 K/UL
MONOCYTES NFR BLD AUTO: 8.4 %
NEUTROPHILS # BLD AUTO: 5.36 K/UL
NEUTROPHILS NFR BLD AUTO: 62.8 %
PLATELET # BLD AUTO: 268 K/UL
POTASSIUM SERPL-SCNC: 4.4 MMOL/L
PROT SERPL-MCNC: 7 G/DL
RBC # BLD: 4.33 M/UL
RBC # FLD: 13.5 %
SODIUM SERPL-SCNC: 141 MMOL/L
T4 FREE SERPL-MCNC: 1.1 NG/DL
TSH SERPL-ACNC: 1.93 UIU/ML
VIT B12 SERPL-MCNC: 779 PG/ML
WBC # FLD AUTO: 8.53 K/UL

## 2019-07-23 ENCOUNTER — APPOINTMENT (OUTPATIENT)
Dept: ENDOCRINOLOGY | Facility: CLINIC | Age: 54
End: 2019-07-23

## 2019-07-27 LAB
CHOLEST SERPL-MCNC: 123 MG/DL
CHOLEST/HDLC SERPL: 9.5 RATIO
HDLC SERPL-MCNC: 13 MG/DL
LDLC SERPL CALC-MCNC: 57 MG/DL
TRIGL SERPL-MCNC: 267 MG/DL

## 2019-08-05 ENCOUNTER — APPOINTMENT (OUTPATIENT)
Dept: CT IMAGING | Facility: CLINIC | Age: 54
End: 2019-08-05

## 2019-08-12 ENCOUNTER — APPOINTMENT (OUTPATIENT)
Dept: CT IMAGING | Facility: IMAGING CENTER | Age: 54
End: 2019-08-12

## 2019-08-16 ENCOUNTER — APPOINTMENT (OUTPATIENT)
Dept: PULMONOLOGY | Facility: CLINIC | Age: 54
End: 2019-08-16
Payer: COMMERCIAL

## 2019-08-16 VITALS
BODY MASS INDEX: 26.6 KG/M2 | HEIGHT: 71 IN | WEIGHT: 190 LBS | DIASTOLIC BLOOD PRESSURE: 87 MMHG | OXYGEN SATURATION: 98 % | RESPIRATION RATE: 16 BRPM | HEART RATE: 73 BPM | SYSTOLIC BLOOD PRESSURE: 132 MMHG

## 2019-08-16 PROCEDURE — 94729 DIFFUSING CAPACITY: CPT

## 2019-08-16 PROCEDURE — 94726 PLETHYSMOGRAPHY LUNG VOLUMES: CPT

## 2019-08-16 PROCEDURE — 94664 DEMO&/EVAL PT USE INHALER: CPT | Mod: 59

## 2019-08-16 PROCEDURE — 94060 EVALUATION OF WHEEZING: CPT

## 2019-08-16 PROCEDURE — 99244 OFF/OP CNSLTJ NEW/EST MOD 40: CPT | Mod: 25

## 2019-08-16 PROCEDURE — 94750: CPT
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
## 2019-08-16 NOTE — PHYSICAL EXAM
[General Appearance - Well Developed] : well developed [General Appearance - Well Nourished] : well nourished [Normal Conjunctiva] : the conjunctiva exhibited no abnormalities [Normal Oropharynx] : normal oropharynx [Heart Sounds] : normal S1 and S2 [Murmurs] : no murmurs present [Edema] : no peripheral edema present [Auscultation Breath Sounds / Voice Sounds] : lungs were clear to auscultation bilaterally [Lungs Percussion] : the lungs were normal to percussion [Abdomen Tenderness] : non-tender [Abdomen Soft] : soft [Nail Clubbing] : no clubbing of the fingernails [] : no hepato-splenomegaly [Cyanosis, Localized] : no localized cyanosis

## 2019-08-16 NOTE — PROCEDURE
[FreeTextEntry1] : Pulmonary function testing.\par FEV1, FVC, and FEV1/FVC are within normal limits. There was not a significant response to inhaled bronchodilator. TLC and subdivisions are normal. RV/TLC ratio is normal. Resistance is normal; specific conductance is decreased. Single breath diffusion capacity is normal. \par \par [place] [address]

## 2019-09-06 ENCOUNTER — APPOINTMENT (OUTPATIENT)
Dept: ENDOCRINOLOGY | Facility: CLINIC | Age: 54
End: 2019-09-06

## 2019-09-18 ENCOUNTER — APPOINTMENT (OUTPATIENT)
Dept: PULMONOLOGY | Facility: CLINIC | Age: 54
End: 2019-09-18
Payer: COMMERCIAL

## 2019-09-18 PROCEDURE — 95800 SLP STDY UNATTENDED: CPT

## 2019-09-19 PROCEDURE — 95800 SLP STDY UNATTENDED: CPT | Mod: 52

## 2019-10-14 ENCOUNTER — APPOINTMENT (OUTPATIENT)
Dept: PULMONOLOGY | Facility: CLINIC | Age: 54
End: 2019-10-14
Payer: COMMERCIAL

## 2019-10-14 VITALS
WEIGHT: 190 LBS | BODY MASS INDEX: 26.6 KG/M2 | HEART RATE: 82 BPM | SYSTOLIC BLOOD PRESSURE: 152 MMHG | TEMPERATURE: 98 F | DIASTOLIC BLOOD PRESSURE: 97 MMHG | RESPIRATION RATE: 16 BRPM | OXYGEN SATURATION: 98 % | HEIGHT: 71 IN

## 2019-10-14 DIAGNOSIS — R05 COUGH: ICD-10-CM

## 2019-10-14 DIAGNOSIS — G47.33 OBSTRUCTIVE SLEEP APNEA (ADULT) (PEDIATRIC): ICD-10-CM

## 2019-10-14 PROCEDURE — 99214 OFFICE O/P EST MOD 30 MIN: CPT

## 2019-10-14 RX ORDER — METOPROLOL TARTRATE 25 MG/1
25 TABLET, FILM COATED ORAL
Refills: 0 | Status: ACTIVE | COMMUNITY
Start: 2019-10-14

## 2019-10-14 RX ORDER — OLMESARTAN MEDOXOMIL 40 MG/1
40 TABLET, FILM COATED ORAL
Qty: 90 | Refills: 3 | Status: ACTIVE | COMMUNITY
Start: 2019-10-14 | End: 1900-01-01

## 2019-10-14 NOTE — PHYSICAL EXAM
[General Appearance - Well Developed] : well developed [General Appearance - Well Nourished] : well nourished [Normal Conjunctiva] : the conjunctiva exhibited no abnormalities [Normal Oropharynx] : normal oropharynx [Heart Sounds] : normal S1 and S2 [Murmurs] : no murmurs present [Edema] : no peripheral edema present [Auscultation Breath Sounds / Voice Sounds] : lungs were clear to auscultation bilaterally [Lungs Percussion] : the lungs were normal to percussion [Abdomen Soft] : soft [Abdomen Tenderness] : non-tender [] : no hepato-splenomegaly [Nail Clubbing] : no clubbing of the fingernails [Cyanosis, Localized] : no localized cyanosis

## 2019-10-17 NOTE — ASSESSMENT
[FreeTextEntry1] : \par Restart CPAP will get auto cpap 5-18 with a Resmed Air fit 20 med nasal mask\par Change to ARB and f/u cardio re BP

## 2019-10-17 NOTE — HISTORY OF PRESENT ILLNESS
[Obstructive Sleep Apnea] : obstructive sleep apnea [Snoring] : snoring [Witnessed Apneas] : witnessed sleep apnea [Daytime Somnolence] : daytime somnolence [Unintentional Sleep While Inactive] : unintentional sleep while inactive [Awakes Unrefreshed] : awakening unrefreshed [Unintentional Sleep while Active] : no unintentional sleep while active [Recent  Weight Gain] : no recent weight gain [Awakes with Headache] : no headache upon awakening [FreeTextEntry1] : FABIO TINEO is a 54 year old  M referred for pulmonary evaluation for ARELIS\par \par Dx ARELIS about 2012. Machine old. Not using regularly. \par takes Excedrin PM to sleep. \par \par On prednione for cough /bronchitis, on enalapril\par \par has IDDM to see new endocrinologist.

## 2019-10-17 NOTE — DISCUSSION/SUMMARY
[FreeTextEntry1] : ARELIS\par The pathophysiology as well as medical implications of untreated obstructive sleep apnea syndrome were discussed with this patient. Treatment options including CPAP therapy, Inspire,  mandibular advancement device and weight loss were also discussed.\par \par cough ? ace side effect

## 2019-11-13 ENCOUNTER — APPOINTMENT (OUTPATIENT)
Dept: ENDOCRINOLOGY | Facility: CLINIC | Age: 54
End: 2019-11-13
Payer: COMMERCIAL

## 2019-11-13 ENCOUNTER — LABORATORY RESULT (OUTPATIENT)
Age: 54
End: 2019-11-13

## 2019-11-13 VITALS
HEART RATE: 87 BPM | OXYGEN SATURATION: 96 % | HEIGHT: 71 IN | WEIGHT: 189 LBS | SYSTOLIC BLOOD PRESSURE: 124 MMHG | DIASTOLIC BLOOD PRESSURE: 78 MMHG | BODY MASS INDEX: 26.46 KG/M2

## 2019-11-13 PROCEDURE — 99214 OFFICE O/P EST MOD 30 MIN: CPT

## 2019-11-13 PROCEDURE — 99204 OFFICE O/P NEW MOD 45 MIN: CPT

## 2019-11-13 PROCEDURE — 83036 HEMOGLOBIN GLYCOSYLATED A1C: CPT | Mod: QW

## 2019-11-13 PROCEDURE — 82962 GLUCOSE BLOOD TEST: CPT

## 2019-11-13 RX ORDER — ENALAPRIL MALEATE 10 MG/1
10 TABLET ORAL
Refills: 0 | Status: DISCONTINUED | COMMUNITY
End: 2019-11-13

## 2019-11-13 RX ORDER — ERGOCALCIFEROL (VITAMIN D2) 1250 MCG
50000 CAPSULE ORAL
Qty: 13 | Refills: 0 | Status: DISCONTINUED | COMMUNITY
End: 2019-11-13

## 2019-11-13 RX ORDER — INSULIN LISPRO 100 [IU]/ML
100 INJECTION, SOLUTION INTRAVENOUS; SUBCUTANEOUS
Refills: 0 | Status: ACTIVE | COMMUNITY

## 2019-11-13 RX ORDER — INSULIN GLARGINE 100 [IU]/ML
100 INJECTION, SOLUTION SUBCUTANEOUS
Refills: 0 | Status: ACTIVE | COMMUNITY
Start: 2019-11-13

## 2019-11-13 RX ORDER — MULTIVIT-MIN/FOLIC/VIT K/LYCOP 400-300MCG
25 MCG TABLET ORAL DAILY
Qty: 90 | Refills: 0 | Status: ACTIVE | COMMUNITY
Start: 2019-11-13

## 2019-12-01 NOTE — PHYSICAL EXAM
[No Acute Distress] : no acute distress [Well Nourished] : well nourished [Alert] : alert [Normal Sclera/Conjunctiva] : normal sclera/conjunctiva [Well Developed] : well developed [No Proptosis] : no proptosis [EOMI] : extra ocular movement intact [Normal Oropharynx] : the oropharynx was normal [Thyroid Not Enlarged] : the thyroid was not enlarged [No Thyroid Nodules] : there were no palpable thyroid nodules [No Accessory Muscle Use] : no accessory muscle use [No Respiratory Distress] : no respiratory distress [Clear to Auscultation] : lungs were clear to auscultation bilaterally [Normal S1, S2] : normal S1 and S2 [Normal Rate] : heart rate was normal  [Regular Rhythm] : with a regular rhythm [Pedal Pulses Normal] : the pedal pulses are present [Normal Bowel Sounds] : normal bowel sounds [Not Tender] : non-tender [No Edema] : there was no peripheral edema [Not Distended] : not distended [Post Cervical Nodes] : posterior cervical nodes [Soft] : abdomen soft [Axillary Nodes] : axillary nodes [Anterior Cervical Nodes] : anterior cervical nodes [No Spinal Tenderness] : no spinal tenderness [Normal] : normal and non tender [No Stigmata of Cushings Syndrome] : no stigmata of cushings syndrome [Spine Straight] : spine straight [Normal Gait] : normal gait [Normal Strength/Tone] : muscle strength and tone were normal [No Rash] : no rash [Normal Reflexes] : deep tendon reflexes were 2+ and symmetric [No Tremors] : no tremors [Oriented x3] : oriented to person, place, and time [Acanthosis Nigricans] : no acanthosis nigricans

## 2019-12-01 NOTE — HISTORY OF PRESENT ILLNESS
[FreeTextEntry1] : Mr Ayala  presents today courtesy of Dr. Melgar regarding Diabetes type 2. Diabetes has been present for nine years. He too has hypothyroidism for the past five years.  His father has history of diabetes and thyroid disease. Previous A!c in July  was 9.0%. today's A1c is 9.5%  and Poct bg is 340. He complains of dizziness, blurry vision, and increase thirst. Patient was provided with 4 glasses of water.  reports did not take humalog today  he has been having elevated bg and symptoms of increase thirst , dizziness and blurry vision for the past several weeks. There is no known history of retinopathy, however has  nephropathy. He too has  history of neuropathy to finger and toes..He is currently taking lantus 40 units in hs and Humalog based on sliding scale. HGM has been running prior to breakfast greater than 200 mg/dl . He tests once daily.  There has been no significant hypoglycemia. He denies any chest pain, sob, neurologic or ophthalmologic complaints. He too denies any new podiatric concerns. He is not up to date with his ophthalmologic visit. additional history include that of HTN, hyperlipidemia, vitamin d deficiency and hypothyroidism\par  \par History CHF  and had  four stents  placement in May 2019. Takes 5 to 15 units of Humalog, usually 10 units. In the past used FreeStyle frieda, however did not like it.\par \par \par

## 2019-12-24 ENCOUNTER — OTHER (OUTPATIENT)
Age: 54
End: 2019-12-24

## 2019-12-24 DIAGNOSIS — D50.8 OTHER IRON DEFICIENCY ANEMIAS: ICD-10-CM

## 2019-12-24 LAB
25(OH)D3 SERPL-MCNC: 48 NG/ML
ALBUMIN SERPL ELPH-MCNC: 4.9 G/DL
ALP BLD-CCNC: 94 U/L
ALT SERPL-CCNC: 23 U/L
ANION GAP SERPL CALC-SCNC: 16 MMOL/L
AST SERPL-CCNC: 17 U/L
BASOPHILS # BLD AUTO: 0.1 K/UL
BASOPHILS NFR BLD AUTO: 1.2 %
BILIRUB SERPL-MCNC: 0.3 MG/DL
BUN SERPL-MCNC: 12 MG/DL
CALCIUM SERPL-MCNC: 9.7 MG/DL
CHLORIDE SERPL-SCNC: 96 MMOL/L
CO2 SERPL-SCNC: 24 MMOL/L
CREAT SERPL-MCNC: 0.8 MG/DL
CREAT SPEC-SCNC: 88 MG/DL
EOSINOPHIL # BLD AUTO: 0.23 K/UL
EOSINOPHIL NFR BLD AUTO: 2.7 %
ESTIMATED AVERAGE GLUCOSE: 229 MG/DL
FRUCTOSAMINE SERPL-MCNC: 367 UMOL/L
GLUCOSE BLDC GLUCOMTR-MCNC: 340
GLUCOSE SERPL-MCNC: 262 MG/DL
GLYCOMARK.: 1 UG/ML
HBA1C MFR BLD HPLC: 9.5
HBA1C MFR BLD HPLC: 9.6 %
HCT VFR BLD CALC: 37.1 %
HDLC SERPL-MCNC: 18 MG/DL
HGB BLD-MCNC: 11.6 G/DL
IMM GRANULOCYTES NFR BLD AUTO: 0.7 %
LDLC SERPL DIRECT ASSAY-MCNC: 80 MG/DL
LYMPHOCYTES # BLD AUTO: 1.55 K/UL
LYMPHOCYTES NFR BLD AUTO: 18.1 %
MAN DIFF?: NORMAL
MCHC RBC-ENTMCNC: 28.9 PG
MCHC RBC-ENTMCNC: 31.3 GM/DL
MCV RBC AUTO: 92.5 FL
MICROALBUMIN 24H UR DL<=1MG/L-MCNC: <1.2 MG/DL
MICROALBUMIN/CREAT 24H UR-RTO: NORMAL MG/G
MONOCYTES # BLD AUTO: 0.68 K/UL
MONOCYTES NFR BLD AUTO: 7.9 %
NEUTROPHILS # BLD AUTO: 5.96 K/UL
NEUTROPHILS NFR BLD AUTO: 69.4 %
PLATELET # BLD AUTO: 286 K/UL
POTASSIUM SERPL-SCNC: 4.5 MMOL/L
PROT SERPL-MCNC: 7.1 G/DL
RBC # BLD: 4.01 M/UL
RBC # FLD: 13.7 %
SODIUM SERPL-SCNC: 136 MMOL/L
T3FREE SERPL-MCNC: 3.21 PG/ML
T4 FREE SERPL-MCNC: 1.3 NG/DL
THYROGLOB AB SERPL-ACNC: <20 IU/ML
THYROGLOB SERPL-MCNC: 14.4 NG/ML
TRIGL SERPL-MCNC: 209 MG/DL
TSH SERPL-ACNC: 3.33 UIU/ML
WBC # FLD AUTO: 8.58 K/UL

## 2020-01-15 ENCOUNTER — APPOINTMENT (OUTPATIENT)
Dept: ENDOCRINOLOGY | Facility: CLINIC | Age: 55
End: 2020-01-15

## 2020-01-24 ENCOUNTER — APPOINTMENT (OUTPATIENT)
Dept: ENDOCRINOLOGY | Facility: CLINIC | Age: 55
End: 2020-01-24

## 2020-03-04 ENCOUNTER — NON-APPOINTMENT (OUTPATIENT)
Age: 55
End: 2020-03-04

## 2020-03-04 ENCOUNTER — APPOINTMENT (OUTPATIENT)
Dept: CARDIOLOGY | Facility: CLINIC | Age: 55
End: 2020-03-04
Payer: COMMERCIAL

## 2020-03-04 VITALS
TEMPERATURE: 98.6 F | DIASTOLIC BLOOD PRESSURE: 74 MMHG | SYSTOLIC BLOOD PRESSURE: 120 MMHG | HEART RATE: 78 BPM | HEIGHT: 71 IN | BODY MASS INDEX: 26.46 KG/M2 | WEIGHT: 189 LBS | OXYGEN SATURATION: 99 %

## 2020-03-04 DIAGNOSIS — D64.9 ANEMIA, UNSPECIFIED: ICD-10-CM

## 2020-03-04 DIAGNOSIS — R94.31 ABNORMAL ELECTROCARDIOGRAM [ECG] [EKG]: ICD-10-CM

## 2020-03-04 DIAGNOSIS — R09.81 NASAL CONGESTION: ICD-10-CM

## 2020-03-04 DIAGNOSIS — R82.90 UNSPECIFIED ABNORMAL FINDINGS IN URINE: ICD-10-CM

## 2020-03-04 DIAGNOSIS — R07.9 CHEST PAIN, UNSPECIFIED: ICD-10-CM

## 2020-03-04 PROCEDURE — 99214 OFFICE O/P EST MOD 30 MIN: CPT

## 2020-03-04 PROCEDURE — 93000 ELECTROCARDIOGRAM COMPLETE: CPT

## 2020-03-04 NOTE — HISTORY OF PRESENT ILLNESS
[FreeTextEntry1] : Bravo is a 55yo male who presents for routine follow-up. Patient has PMH of DM2, hypothyroidism, CAD, HTN, ARELIS, anemia, and HLD. Patient reports he had blood work done from another physician and would like to review today. Patient denies chest pain, shortness of breath, palpitations, dizziness, vision changes, n/v, abdominal pain, changes in bowel/bladder habits,  or appetite. pt with rare nasal congestion and ocassional chest discomfort described as vibration

## 2020-03-08 LAB
APPEARANCE: CLEAR
BACTERIA: NEGATIVE
BILIRUBIN URINE: NEGATIVE
BLOOD URINE: NEGATIVE
COLOR: YELLOW
GLUCOSE QUALITATIVE U: NEGATIVE
HYALINE CASTS: 0 /LPF
KETONES URINE: NEGATIVE
LEUKOCYTE ESTERASE URINE: NEGATIVE
MICROSCOPIC-UA: NORMAL
NITRITE URINE: NEGATIVE
PH URINE: 6
PROTEIN URINE: NORMAL
RED BLOOD CELLS URINE: 0 /HPF
SPECIFIC GRAVITY URINE: 1.03
SQUAMOUS EPITHELIAL CELLS: 1 /HPF
T4 FREE SERPL-MCNC: 1.4 NG/DL
TSH SERPL-ACNC: 2.91 UIU/ML
UROBILINOGEN URINE: NORMAL
WHITE BLOOD CELLS URINE: 0 /HPF

## 2020-04-21 RX ORDER — LEVOTHYROXINE SODIUM 75 UG/1
75 TABLET ORAL DAILY
Qty: 90 | Refills: 1 | Status: ACTIVE | COMMUNITY
Start: 1900-01-01 | End: 1900-01-01

## 2020-04-23 ENCOUNTER — APPOINTMENT (OUTPATIENT)
Dept: ENDOCRINOLOGY | Facility: CLINIC | Age: 55
End: 2020-04-23
Payer: COMMERCIAL

## 2020-04-23 ENCOUNTER — TRANSCRIPTION ENCOUNTER (OUTPATIENT)
Age: 55
End: 2020-04-23

## 2020-04-23 DIAGNOSIS — I10 ESSENTIAL (PRIMARY) HYPERTENSION: ICD-10-CM

## 2020-04-23 DIAGNOSIS — E03.9 HYPOTHYROIDISM, UNSPECIFIED: ICD-10-CM

## 2020-04-23 DIAGNOSIS — E55.9 VITAMIN D DEFICIENCY, UNSPECIFIED: ICD-10-CM

## 2020-04-23 DIAGNOSIS — E78.5 HYPERLIPIDEMIA, UNSPECIFIED: ICD-10-CM

## 2020-04-23 DIAGNOSIS — I25.10 ATHEROSCLEROTIC HEART DISEASE OF NATIVE CORONARY ARTERY W/OUT ANGINA PECTORIS: ICD-10-CM

## 2020-04-23 DIAGNOSIS — E11.9 TYPE 2 DIABETES MELLITUS W/OUT COMPLICATIONS: ICD-10-CM

## 2020-04-23 PROCEDURE — 99214 OFFICE O/P EST MOD 30 MIN: CPT | Mod: 95

## 2020-04-23 RX ORDER — LANCETS 33 GAUGE
EACH MISCELLANEOUS
Qty: 3 | Refills: 3 | Status: ACTIVE | COMMUNITY
Start: 2019-03-31 | End: 1900-01-01

## 2020-04-23 RX ORDER — BLOOD SUGAR DIAGNOSTIC
STRIP MISCELLANEOUS 3 TIMES DAILY
Qty: 3 | Refills: 3 | Status: ACTIVE | COMMUNITY
Start: 2019-03-31 | End: 1900-01-01

## 2020-04-24 ENCOUNTER — APPOINTMENT (OUTPATIENT)
Dept: CARDIOLOGY | Facility: CLINIC | Age: 55
End: 2020-04-24
Payer: COMMERCIAL

## 2020-04-24 DIAGNOSIS — R68.89 OTHER GENERAL SYMPTOMS AND SIGNS: ICD-10-CM

## 2020-04-24 PROCEDURE — 99214 OFFICE O/P EST MOD 30 MIN: CPT | Mod: 95

## 2020-04-26 NOTE — REVIEW OF SYSTEMS
[Cough] : cough [Joint Pain] : joint pain [Negative] : Heme/Lymph [Dyspnea on Exertion] : not dyspnea on exertion [Shortness Of Breath] : no shortness of breath [Wheezing] : no wheezing [Muscle Weakness] : no muscle weakness [Joint Stiffness] : no joint stiffness [Muscle Pain] : no muscle pain [Back Pain] : no back pain [Joint Swelling] : no joint swelling

## 2020-04-26 NOTE — HISTORY OF PRESENT ILLNESS
[Home] : at home, [unfilled] , at the time of the visit. [Medical Office: ([facility])___] : at the medical office located in  [Patient] : the patient [FreeTextEntry8] : This is a 54 year old gentlemen with a PMH of DM, HTN, HLD, and Hypothyroidism presents today for an acute visit via tele-health. Patient explains that he is unsure if he has had exposure to a COVID infected individuals. Patient with cough and body aches. he denies fever and shortness of breath, although his wife is experiencing fever.  [FreeTextEntry2] : Bravo Ayala

## 2020-05-16 PROBLEM — I25.10 CAD (CORONARY ARTERY DISEASE): Status: ACTIVE | Noted: 2019-05-09

## 2020-05-16 PROBLEM — I10 HYPERTENSION: Status: ACTIVE | Noted: 2019-05-23

## 2020-05-16 PROBLEM — E11.9 DIABETES MELLITUS: Status: ACTIVE | Noted: 2019-04-22

## 2020-05-16 PROBLEM — E55.9 VITAMIN D DEFICIENCY: Status: ACTIVE | Noted: 2019-04-22

## 2020-05-16 PROBLEM — E78.5 HYPERLIPIDEMIA, UNSPECIFIED HYPERLIPIDEMIA TYPE: Status: ACTIVE | Noted: 2019-04-22

## 2020-05-16 PROBLEM — E03.9 HYPOTHYROIDISM: Status: ACTIVE | Noted: 2019-11-13

## 2020-05-16 NOTE — HISTORY OF PRESENT ILLNESS
[Home] : at home, [unfilled] , at the time of the visit. [Medical Office: ([facility])___] : at the medical office located in  [Self] : self [Patient] : the patient [FreeTextEntry1] : Mr Ayala returns today in f/u with regard to type 2 dm.. Diabetes has been present for nine years. He too has hypothyroidism for the past five years. Previous A!c fromlast November returned at 9.6%.   reports did not take humalog today  he has been having elevated bg and symptoms of increase thirst , dizziness and blurry vision for the past several weeks. There is no known history of retinopathy, however has  nephropathy. He too has  history of neuropathy to finger and toes..He is currently taking lantus 40 units in hs and Humalog based on sliding scale. HGM has been running prior to breakfast greater than 200 mg/dl . He tests once daily.  There has been no significant hypoglycemia. He denies any chest pain, sob, neurologic or ophthalmologic complaints. He too denies any new podiatric concerns. He is not up to date with his ophthalmologic visit. additional history include that of HTN, hyperlipidemia, vitamin d deficiency and hypothyroidism along with cad for which he is s/p coronary stent placement-on brillinta.  He toois taking Synthjroid 75 mcg daily. Recetn tsh returned at 2.91\par  \par History CHF  and had  four stents  placement in May 2019. Takes 5 to 15 units of Humalog, usually 10 units. In the past used FreeStyle frieda, however did not like it.\par \par \par

## 2020-06-17 NOTE — PROGRESS NOTE ADULT - PROBLEM/PLAN-7
Cardiac risk is elevated based on cholesterol values and hypertension.  At his last visit we discussed this being a possibility.  We discussed option of coronary artery calcium CT scanning verses initiation of a statin medication.  Please ask him which he would prefer.  
Relayed result to pt who verbalized understanding.  Pt would like to start statin medication.  
crestor sent to pharmacy. Need lipids, alt and appointment with me in 3 months.   
DISPLAY PLAN FREE TEXT

## 2020-12-07 ENCOUNTER — RX RENEWAL (OUTPATIENT)
Age: 55
End: 2020-12-07

## 2020-12-30 ENCOUNTER — NON-APPOINTMENT (OUTPATIENT)
Age: 55
End: 2020-12-30

## 2021-06-19 ENCOUNTER — RX RENEWAL (OUTPATIENT)
Age: 56
End: 2021-06-19

## 2021-06-19 RX ORDER — ROSUVASTATIN CALCIUM 40 MG/1
40 TABLET, FILM COATED ORAL DAILY
Qty: 30 | Refills: 0 | Status: ACTIVE | COMMUNITY
Start: 2020-12-07 | End: 1900-01-01

## 2021-10-03 ENCOUNTER — RX RENEWAL (OUTPATIENT)
Age: 56
End: 2021-10-03

## 2021-10-03 RX ORDER — LEVOTHYROXINE SODIUM 0.07 MG/1
75 TABLET ORAL
Qty: 90 | Refills: 0 | Status: ACTIVE | COMMUNITY
Start: 2021-05-04 | End: 1900-01-01

## 2021-12-16 NOTE — DISCHARGE NOTE PROVIDER - NSDCCPGOAL_GEN_ALL_CORE_FT
Next appt 1/20/22  Last appt 8/24/21    Refill request for  sertraline (ZOLOFT) 50 MG tablet 30 tablet 3 11/2/2021     Sig - Route: TAKE 1 TABLET BY MOUTH DAILY - Oral    Sent to pharmacy as: Sertraline HCl 50 MG Oral Tablet (ZOLOFT)    Class: Eprescribe    E-Prescribing Status: Receipt confirmed by pharmacy (11/2/2021 11:27 AM CDT)          Refilled per standing med protocol.     To get better and follow your care plan as instructed.

## 2022-05-14 ENCOUNTER — RX RENEWAL (OUTPATIENT)
Age: 57
End: 2022-05-14

## 2022-07-19 ENCOUNTER — RX RENEWAL (OUTPATIENT)
Age: 57
End: 2022-07-19

## 2022-08-24 ENCOUNTER — RX RENEWAL (OUTPATIENT)
Age: 57
End: 2022-08-24

## 2022-08-25 ENCOUNTER — RX RENEWAL (OUTPATIENT)
Age: 57
End: 2022-08-25

## 2024-01-01 NOTE — PROGRESS NOTE ADULT - PROBLEM SELECTOR PLAN 2
c/w aspirin, Brilinta, atorvastatin, enalapril, Metoprolol
c/w aspirin, Brilinta, atorvastatin, enalapril, Metoprolol  CAD s/p hx of stent)
c/w aspirin, Brilinta, atorvastatin, enalapril, Metoprolol  S/P LHC with ELSY to mLAD ( 70%)
c/w aspirin, Brilinta, atorvastatin, enalapril, Metoprolol  S/P LHC with ELSY to mLAD ( 70%)
(4) History of Falls or Infant-Toddler Placed in Bed

## 2024-01-08 NOTE — PATIENT PROFILE ADULT - NSPROPTRIGHTBILLOFRIGHTS_GEN_A_NUR
SUBJECTIVE: Jay Colon is a 65 y.o. male who returns to the office with chief complaint of painful fungal toenails. Patient relates toe nails are thickened/difficult to trim as well as painful with ambulation and with shoe gear.   Chief Complaint   Patient presents with    Nail Problem     Right foot nail trim, last seen Renny Wood MD 9/19/23    Diabetes     Last blood sugar 141     Review of Systems   Constitutional:  Negative for activity change, appetite change, chills, diaphoresis, fatigue and fever.   Respiratory:  Negative for shortness of breath.    Cardiovascular:  Negative for leg swelling.   Gastrointestinal:  Negative for diarrhea and nausea.   Endocrine: Negative for cold intolerance, heat intolerance and polyuria.   Musculoskeletal:  Positive for arthralgias and gait problem. Negative for back pain, joint swelling and myalgias.   Skin:  Negative for color change, pallor, rash and wound.   Allergic/Immunologic: Negative for environmental allergies and food allergies.   Neurological:  Positive for numbness. Negative for dizziness, weakness and light-headedness.   Hematological:  Does not bruise/bleed easily.   Psychiatric/Behavioral:  Negative for behavioral problems, confusion and self-injury. The patient is not nervous/anxious.      OBJECTIVE: Clinical evaluation of patient reveals nails 1,4,5 of the right foot present with thickness, elongation, discoloration, brittleness, and subungual debris. There was pain with palpation and debridement of the toenails of the right foot No open lesions noted to the right foot today. 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   The right DP pulse is not palpable.   The right PT pulse is not palpable.    Protective sensation is absent to the right plantar foot as noted with a 5.07 Cooks-Gaudencio monofilament.    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